# Patient Record
Sex: FEMALE | Race: WHITE | NOT HISPANIC OR LATINO | Employment: OTHER
[De-identification: names, ages, dates, MRNs, and addresses within clinical notes are randomized per-mention and may not be internally consistent; named-entity substitution may affect disease eponyms.]

---

## 2018-01-30 ENCOUNTER — HOSPITAL (OUTPATIENT)
Dept: OTHER | Age: 73
End: 2018-01-30
Attending: FAMILY MEDICINE

## 2018-10-03 ENCOUNTER — LAB ENCOUNTER (OUTPATIENT)
Dept: LAB | Facility: HOSPITAL | Age: 73
End: 2018-10-03
Attending: FAMILY MEDICINE
Payer: MEDICARE

## 2018-10-03 ENCOUNTER — OFFICE VISIT (OUTPATIENT)
Dept: FAMILY MEDICINE CLINIC | Facility: CLINIC | Age: 73
End: 2018-10-03
Payer: MEDICARE

## 2018-10-03 ENCOUNTER — HOSPITAL ENCOUNTER (OUTPATIENT)
Dept: GENERAL RADIOLOGY | Facility: HOSPITAL | Age: 73
Discharge: HOME | End: 2018-10-03
Attending: FAMILY MEDICINE
Payer: MEDICARE

## 2018-10-03 ENCOUNTER — TELEPHONE (OUTPATIENT)
Dept: FAMILY MEDICINE CLINIC | Facility: CLINIC | Age: 73
End: 2018-10-03

## 2018-10-03 VITALS
BODY MASS INDEX: 25.48 KG/M2 | HEIGHT: 70 IN | RESPIRATION RATE: 16 BRPM | OXYGEN SATURATION: 98 % | SYSTOLIC BLOOD PRESSURE: 140 MMHG | WEIGHT: 178 LBS | TEMPERATURE: 98 F | DIASTOLIC BLOOD PRESSURE: 80 MMHG | HEART RATE: 59 BPM

## 2018-10-03 DIAGNOSIS — Z01.812 PRE-OPERATIVE LABORATORY EXAMINATION: ICD-10-CM

## 2018-10-03 DIAGNOSIS — Z01.818 PREOP EXAMINATION: ICD-10-CM

## 2018-10-03 DIAGNOSIS — I10 ESSENTIAL HYPERTENSION: ICD-10-CM

## 2018-10-03 DIAGNOSIS — M17.12 ARTHRITIS OF LEFT KNEE: ICD-10-CM

## 2018-10-03 DIAGNOSIS — K21.00 GERD WITH ESOPHAGITIS: ICD-10-CM

## 2018-10-03 DIAGNOSIS — Z01.818 PREOP EXAMINATION: Primary | ICD-10-CM

## 2018-10-03 PROCEDURE — 86850 RBC ANTIBODY SCREEN: CPT

## 2018-10-03 PROCEDURE — 36415 COLL VENOUS BLD VENIPUNCTURE: CPT

## 2018-10-03 PROCEDURE — 93010 ELECTROCARDIOGRAM REPORT: CPT | Performed by: FAMILY MEDICINE

## 2018-10-03 PROCEDURE — 93005 ELECTROCARDIOGRAM TRACING: CPT

## 2018-10-03 PROCEDURE — 71046 X-RAY EXAM CHEST 2 VIEWS: CPT | Performed by: FAMILY MEDICINE

## 2018-10-03 PROCEDURE — 86901 BLOOD TYPING SEROLOGIC RH(D): CPT

## 2018-10-03 PROCEDURE — 81015 MICROSCOPIC EXAM OF URINE: CPT

## 2018-10-03 PROCEDURE — 87081 CULTURE SCREEN ONLY: CPT

## 2018-10-03 PROCEDURE — 85025 COMPLETE CBC W/AUTO DIFF WBC: CPT

## 2018-10-03 PROCEDURE — 99204 OFFICE O/P NEW MOD 45 MIN: CPT | Performed by: FAMILY MEDICINE

## 2018-10-03 PROCEDURE — 86900 BLOOD TYPING SEROLOGIC ABO: CPT

## 2018-10-03 PROCEDURE — 80053 COMPREHEN METABOLIC PANEL: CPT

## 2018-10-03 RX ORDER — HYDROCHLOROTHIAZIDE 25 MG/1
25 TABLET ORAL DAILY
COMMUNITY
Start: 2018-08-29

## 2018-10-03 NOTE — TELEPHONE ENCOUNTER
Surgery on 10/22/18, Lizz Pena with Dr. Greer Mandel @ [facility]  H&P- completed  Labs- slightly elevated bilirubin (1.3), elevated BUN/Creat (27.5),  all other labs WNL  EKG- abnormal  X-ray- WNL

## 2018-10-03 NOTE — PHYSICAL THERAPY NOTE
Pre-Op Evaluation     Date of surgery: 10/22/18             Procedure: Luis Monk  Physician: Desi Song    Pertinent PMH: GERD, HTN, Wears glasses, 2018 vascular sx B legs, 1998 heel spur removed, 2003 tummy tuck, former smoker, 2016 L meniscus tear  Living situat if she could stay there for assist post-operatively.  Based on pertinent PMH and social factors, pain level 7-8/10, and RAPT score of 6/12, patient's current discharge plan is home with family assist and home health but may choose to go directly to [facility]

## 2018-10-04 NOTE — H&P (VIEW-ONLY)
[facility] PRE-OP CLINIC [facility]    PRE-OP NOTE    HPI:   Prentis Osgood is a 68year old female with a hx of severe degenerative arthritis left knee who presents for a pre-operative physical exam. Patient is to have Left TKA by Dr. Ada Hoyos on 10/22/18.      P Frequency: Never      Drug use: No      Sexual activity: Not on file    Other Topics      Concerns:        Not on file    Social History Narrative      Not on file      REVIEW OF SYSTEMS:   CONSTITUTIONAL:  Denies unusual weight gain/loss, fever, chills  E good turgor. HEART:  Regular rate and rhythm, no murmurs, rubs or gallops. LUNGS: Clear to auscultation bilterally, no rales/rhonchi/wheezing. CHEST: No tenderness.   ABDOMEN:  Soft, nondistended, nontender, bowel sounds normal, no masses, no hepatosplen reviewed. No acute changes. Pt very active and asymptomatic. No further workup needed at this time. cxr with degererative changes, also no further work up needed.      After examining Logan Dire and reviewing the preoperative testing, she has an ASA Classificatio

## 2018-10-04 NOTE — TELEPHONE ENCOUNTER
Dr. Cem Munoz- please review labs- slightly elevated bilirubin (1.3), elevated BUN/Creat (27.5),  all other labs WNL  EKG- abnormal  X-ray- WNL    OK for surgery?

## 2018-10-04 NOTE — H&P
[facility] [place] CLIN[place]    PRE-OP NOTE    HPI:   Samra Harry is a 68year old female with a hx of severe degenerative arthritis left knee who presents for a pre-operative physical exam. Patient is to have Left TKA by Dr. Geri Schultz on 10/22/18.      P Frequency: Never      Drug use: No      Sexual activity: Not on file    Other Topics      Concerns:        Not on file    Social History Narrative      Not on file      REVIEW OF SYSTEMS:   CONSTITUTIONAL:  Denies unusual weight gain/loss, fever, chills  E good turgor. HEART:  Regular rate and rhythm, no murmurs, rubs or gallops. LUNGS: Clear to auscultation bilterally, no rales/rhonchi/wheezing. CHEST: No tenderness.   ABDOMEN:  Soft, nondistended, nontender, bowel sounds normal, no masses, no hepatosplen reviewed. No acute changes. Pt very active and asymptomatic. No further workup needed at this time. cxr with degererative changes, also no further work up needed.      After examining Judy Wang and reviewing the preoperative testing, she has an ASA Classificatio

## 2018-10-09 NOTE — TELEPHONE ENCOUNTER
Spoke to patient, let her know she is clear for surgery per Dr. Oanh Potts. She states understanding.

## 2018-10-16 RX ORDER — LANSOPRAZOLE 30 MG/1
30 CAPSULE, DELAYED RELEASE ORAL
Status: ON HOLD | COMMUNITY
End: 2018-10-22

## 2018-10-16 NOTE — DISCHARGE SUMMARY
Ortho Discharge Summary     Patient ID:  Logan Muñiz  [id number]  13 year old  9/11/1945      Admit Date: 10/22/2018    Discharge Date and Time: 10/23/2018    Attending Physician: Smita Keen MD     Reason for admission: left knee DJD    Discharge D

## 2018-10-22 ENCOUNTER — ANESTHESIA (OUTPATIENT)
Dept: SURGERY | Facility: HOSPITAL | Age: 73
DRG: 470 | End: 2018-10-22
Payer: MEDICARE

## 2018-10-22 ENCOUNTER — APPOINTMENT (OUTPATIENT)
Dept: GENERAL RADIOLOGY | Facility: HOSPITAL | Age: 73
DRG: 470 | End: 2018-10-22
Attending: PHYSICIAN ASSISTANT
Payer: MEDICARE

## 2018-10-22 ENCOUNTER — HOSPITAL ENCOUNTER (INPATIENT)
Facility: HOSPITAL | Age: 73
LOS: 1 days | Discharge: HOME HEALTH | DRG: 470 | End: 2018-10-23
Attending: ORTHOPAEDIC SURGERY | Admitting: ORTHOPAEDIC SURGERY
Payer: MEDICARE

## 2018-10-22 ENCOUNTER — ANESTHESIA EVENT (OUTPATIENT)
Dept: SURGERY | Facility: HOSPITAL | Age: 73
DRG: 470 | End: 2018-10-22
Payer: MEDICARE

## 2018-10-22 PROBLEM — Z96.659 S/P KNEE REPLACEMENT: Status: ACTIVE | Noted: 2018-10-22

## 2018-10-22 PROBLEM — Z96.652 STATUS POST LEFT KNEE REPLACEMENT: Status: ACTIVE | Noted: 2018-10-22

## 2018-10-22 PROCEDURE — 85014 HEMATOCRIT: CPT | Performed by: PHYSICIAN ASSISTANT

## 2018-10-22 PROCEDURE — 85018 HEMOGLOBIN: CPT | Performed by: PHYSICIAN ASSISTANT

## 2018-10-22 PROCEDURE — 88311 DECALCIFY TISSUE: CPT | Performed by: ORTHOPAEDIC SURGERY

## 2018-10-22 PROCEDURE — 0SRD0J9 REPLACEMENT OF LEFT KNEE JOINT WITH SYNTHETIC SUBSTITUTE, CEMENTED, OPEN APPROACH: ICD-10-PCS | Performed by: ORTHOPAEDIC SURGERY

## 2018-10-22 PROCEDURE — 73560 X-RAY EXAM OF KNEE 1 OR 2: CPT | Performed by: PHYSICIAN ASSISTANT

## 2018-10-22 PROCEDURE — 88305 TISSUE EXAM BY PATHOLOGIST: CPT | Performed by: ORTHOPAEDIC SURGERY

## 2018-10-22 PROCEDURE — 80053 COMPREHEN METABOLIC PANEL: CPT | Performed by: PHYSICIAN ASSISTANT

## 2018-10-22 RX ORDER — ASPIRIN 325 MG
325 TABLET, DELAYED RELEASE (ENTERIC COATED) ORAL 2 TIMES DAILY
Qty: 60 TABLET | Refills: 0 | Status: ON HOLD | OUTPATIENT
Start: 2018-10-22 | End: 2018-12-27

## 2018-10-22 RX ORDER — HYDROMORPHONE HYDROCHLORIDE 1 MG/ML
1.2 INJECTION, SOLUTION INTRAMUSCULAR; INTRAVENOUS; SUBCUTANEOUS EVERY 2 HOUR PRN
Status: DISCONTINUED | OUTPATIENT
Start: 2018-10-22 | End: 2018-10-23

## 2018-10-22 RX ORDER — SODIUM PHOSPHATE, DIBASIC AND SODIUM PHOSPHATE, MONOBASIC 7; 19 G/133ML; G/133ML
1 ENEMA RECTAL ONCE AS NEEDED
Status: DISCONTINUED | OUTPATIENT
Start: 2018-10-22 | End: 2018-10-23

## 2018-10-22 RX ORDER — METOCLOPRAMIDE 10 MG/1
10 TABLET ORAL ONCE
Status: COMPLETED | OUTPATIENT
Start: 2018-10-22 | End: 2018-10-22

## 2018-10-22 RX ORDER — ONDANSETRON 2 MG/ML
4 INJECTION INTRAMUSCULAR; INTRAVENOUS ONCE AS NEEDED
Status: DISCONTINUED | OUTPATIENT
Start: 2018-10-22 | End: 2018-10-22 | Stop reason: HOSPADM

## 2018-10-22 RX ORDER — CELECOXIB 200 MG/1
200 CAPSULE ORAL ONCE
Status: COMPLETED | OUTPATIENT
Start: 2018-10-22 | End: 2018-10-22

## 2018-10-22 RX ORDER — BISACODYL 10 MG
10 SUPPOSITORY, RECTAL RECTAL
Status: DISCONTINUED | OUTPATIENT
Start: 2018-10-22 | End: 2018-10-23

## 2018-10-22 RX ORDER — NALOXONE HYDROCHLORIDE 0.4 MG/ML
80 INJECTION, SOLUTION INTRAMUSCULAR; INTRAVENOUS; SUBCUTANEOUS AS NEEDED
Status: DISCONTINUED | OUTPATIENT
Start: 2018-10-22 | End: 2018-10-22 | Stop reason: HOSPADM

## 2018-10-22 RX ORDER — CEFAZOLIN SODIUM/WATER 2 G/20 ML
2 SYRINGE (ML) INTRAVENOUS ONCE
Status: DISCONTINUED | OUTPATIENT
Start: 2018-10-22 | End: 2018-10-22 | Stop reason: HOSPADM

## 2018-10-22 RX ORDER — FAMOTIDINE 20 MG/1
20 TABLET ORAL ONCE
Status: DISCONTINUED | OUTPATIENT
Start: 2018-10-22 | End: 2018-10-23

## 2018-10-22 RX ORDER — SODIUM CHLORIDE, SODIUM LACTATE, POTASSIUM CHLORIDE, CALCIUM CHLORIDE 600; 310; 30; 20 MG/100ML; MG/100ML; MG/100ML; MG/100ML
INJECTION, SOLUTION INTRAVENOUS CONTINUOUS
Status: DISCONTINUED | OUTPATIENT
Start: 2018-10-22 | End: 2018-10-22 | Stop reason: HOSPADM

## 2018-10-22 RX ORDER — HYDROMORPHONE HYDROCHLORIDE 1 MG/ML
INJECTION, SOLUTION INTRAMUSCULAR; INTRAVENOUS; SUBCUTANEOUS
Status: COMPLETED
Start: 2018-10-22 | End: 2018-10-22

## 2018-10-22 RX ORDER — DIPHENHYDRAMINE HYDROCHLORIDE 50 MG/ML
12.5 INJECTION INTRAMUSCULAR; INTRAVENOUS EVERY 4 HOURS PRN
Status: DISCONTINUED | OUTPATIENT
Start: 2018-10-22 | End: 2018-10-23

## 2018-10-22 RX ORDER — GABAPENTIN 100 MG/1
100 CAPSULE ORAL 3 TIMES DAILY
Status: DISCONTINUED | OUTPATIENT
Start: 2018-10-22 | End: 2018-10-23

## 2018-10-22 RX ORDER — ASPIRIN 325 MG
325 TABLET ORAL 2 TIMES DAILY
Status: DISCONTINUED | OUTPATIENT
Start: 2018-10-22 | End: 2018-10-23

## 2018-10-22 RX ORDER — ACETAMINOPHEN 500 MG
1000 TABLET ORAL ONCE
Status: COMPLETED | OUTPATIENT
Start: 2018-10-22 | End: 2018-10-22

## 2018-10-22 RX ORDER — SCOLOPAMINE TRANSDERMAL SYSTEM 1 MG/1
1 PATCH, EXTENDED RELEASE TRANSDERMAL ONCE
Status: DISCONTINUED | OUTPATIENT
Start: 2018-10-22 | End: 2018-10-23

## 2018-10-22 RX ORDER — CYCLOBENZAPRINE HCL 5 MG
5 TABLET ORAL EVERY 8 HOURS PRN
Status: DISCONTINUED | OUTPATIENT
Start: 2018-10-22 | End: 2018-10-23

## 2018-10-22 RX ORDER — IBUPROFEN 600 MG/1
600 TABLET ORAL 3 TIMES DAILY
Status: DISCONTINUED | OUTPATIENT
Start: 2018-10-22 | End: 2018-10-23

## 2018-10-22 RX ORDER — MIDAZOLAM HYDROCHLORIDE 1 MG/ML
INJECTION INTRAMUSCULAR; INTRAVENOUS AS NEEDED
Status: DISCONTINUED | OUTPATIENT
Start: 2018-10-22 | End: 2018-10-22 | Stop reason: SURG

## 2018-10-22 RX ORDER — DIPHENHYDRAMINE HYDROCHLORIDE 50 MG/ML
25 INJECTION INTRAMUSCULAR; INTRAVENOUS ONCE AS NEEDED
Status: ACTIVE | OUTPATIENT
Start: 2018-10-22 | End: 2018-10-22

## 2018-10-22 RX ORDER — HYDROCODONE BITARTRATE AND ACETAMINOPHEN 5; 325 MG/1; MG/1
1 TABLET ORAL AS NEEDED
Status: DISCONTINUED | OUTPATIENT
Start: 2018-10-22 | End: 2018-10-22 | Stop reason: HOSPADM

## 2018-10-22 RX ORDER — IBUPROFEN 600 MG/1
600 TABLET ORAL EVERY 8 HOURS PRN
Qty: 90 TABLET | Refills: 2 | Status: ON HOLD | OUTPATIENT
Start: 2018-10-22 | End: 2018-12-27

## 2018-10-22 RX ORDER — TRAMADOL HYDROCHLORIDE 50 MG/1
TABLET ORAL EVERY 6 HOURS PRN
Status: DISCONTINUED | OUTPATIENT
Start: 2018-10-22 | End: 2018-10-23

## 2018-10-22 RX ORDER — SODIUM CHLORIDE, SODIUM LACTATE, POTASSIUM CHLORIDE, CALCIUM CHLORIDE 600; 310; 30; 20 MG/100ML; MG/100ML; MG/100ML; MG/100ML
INJECTION, SOLUTION INTRAVENOUS CONTINUOUS
Status: DISCONTINUED | OUTPATIENT
Start: 2018-10-22 | End: 2018-10-23

## 2018-10-22 RX ORDER — METOCLOPRAMIDE HYDROCHLORIDE 5 MG/ML
10 INJECTION INTRAMUSCULAR; INTRAVENOUS EVERY 6 HOURS PRN
Status: DISCONTINUED | OUTPATIENT
Start: 2018-10-22 | End: 2018-10-23

## 2018-10-22 RX ORDER — DOCUSATE SODIUM 100 MG/1
100 CAPSULE, LIQUID FILLED ORAL 2 TIMES DAILY
Qty: 60 CAPSULE | Refills: 2 | Status: SHIPPED | OUTPATIENT
Start: 2018-10-22 | End: 2018-12-26 | Stop reason: ALTCHOICE

## 2018-10-22 RX ORDER — ONDANSETRON 2 MG/ML
INJECTION INTRAMUSCULAR; INTRAVENOUS AS NEEDED
Status: DISCONTINUED | OUTPATIENT
Start: 2018-10-22 | End: 2018-10-22 | Stop reason: SURG

## 2018-10-22 RX ORDER — ONDANSETRON 2 MG/ML
4 INJECTION INTRAMUSCULAR; INTRAVENOUS EVERY 4 HOURS PRN
Status: DISCONTINUED | OUTPATIENT
Start: 2018-10-22 | End: 2018-10-23

## 2018-10-22 RX ORDER — DOCUSATE SODIUM 100 MG/1
100 CAPSULE, LIQUID FILLED ORAL 2 TIMES DAILY
Status: DISCONTINUED | OUTPATIENT
Start: 2018-10-22 | End: 2018-10-23

## 2018-10-22 RX ORDER — HALOPERIDOL 5 MG/ML
0.25 INJECTION INTRAMUSCULAR ONCE AS NEEDED
Status: DISCONTINUED | OUTPATIENT
Start: 2018-10-22 | End: 2018-10-22 | Stop reason: HOSPADM

## 2018-10-22 RX ORDER — POLYETHYLENE GLYCOL 3350 17 G/17G
17 POWDER, FOR SOLUTION ORAL DAILY PRN
Status: DISCONTINUED | OUTPATIENT
Start: 2018-10-22 | End: 2018-10-23

## 2018-10-22 RX ORDER — FAMOTIDINE 20 MG/1
20 TABLET ORAL 2 TIMES DAILY
Status: DISCONTINUED | OUTPATIENT
Start: 2018-10-22 | End: 2018-10-23

## 2018-10-22 RX ORDER — DIPHENHYDRAMINE HCL 25 MG
25 CAPSULE ORAL EVERY 4 HOURS PRN
Status: DISCONTINUED | OUTPATIENT
Start: 2018-10-22 | End: 2018-10-23

## 2018-10-22 RX ORDER — CEFAZOLIN SODIUM/WATER 2 G/20 ML
2 SYRINGE (ML) INTRAVENOUS EVERY 8 HOURS
Status: COMPLETED | OUTPATIENT
Start: 2018-10-22 | End: 2018-10-23

## 2018-10-22 RX ORDER — ACETAMINOPHEN 325 MG/1
650 TABLET ORAL EVERY 6 HOURS SCHEDULED
Status: DISCONTINUED | OUTPATIENT
Start: 2018-10-22 | End: 2018-10-23

## 2018-10-22 RX ORDER — GABAPENTIN 100 MG/1
100 CAPSULE ORAL 3 TIMES DAILY
Qty: 90 CAPSULE | Refills: 0 | Status: ON HOLD | OUTPATIENT
Start: 2018-10-22 | End: 2018-12-27

## 2018-10-22 RX ORDER — HYDROMORPHONE HYDROCHLORIDE 1 MG/ML
0.8 INJECTION, SOLUTION INTRAMUSCULAR; INTRAVENOUS; SUBCUTANEOUS EVERY 2 HOUR PRN
Status: DISCONTINUED | OUTPATIENT
Start: 2018-10-22 | End: 2018-10-23

## 2018-10-22 RX ORDER — OXYCODONE HYDROCHLORIDE 5 MG/1
TABLET ORAL EVERY 4 HOURS PRN
Qty: 60 TABLET | Refills: 0 | Status: ON HOLD | OUTPATIENT
Start: 2018-10-22 | End: 2018-12-27

## 2018-10-22 RX ORDER — KETOROLAC TROMETHAMINE 15 MG/ML
15 INJECTION, SOLUTION INTRAMUSCULAR; INTRAVENOUS EVERY 8 HOURS
Status: DISCONTINUED | OUTPATIENT
Start: 2018-10-22 | End: 2018-10-23

## 2018-10-22 RX ORDER — OXYCODONE HYDROCHLORIDE 5 MG/1
5 TABLET ORAL EVERY 4 HOURS PRN
Status: DISCONTINUED | OUTPATIENT
Start: 2018-10-22 | End: 2018-10-23

## 2018-10-22 RX ORDER — FAMOTIDINE 10 MG/ML
20 INJECTION, SOLUTION INTRAVENOUS 2 TIMES DAILY
Status: DISCONTINUED | OUTPATIENT
Start: 2018-10-22 | End: 2018-10-23

## 2018-10-22 RX ORDER — HYDROMORPHONE HYDROCHLORIDE 1 MG/ML
0.4 INJECTION, SOLUTION INTRAMUSCULAR; INTRAVENOUS; SUBCUTANEOUS EVERY 2 HOUR PRN
Status: DISCONTINUED | OUTPATIENT
Start: 2018-10-22 | End: 2018-10-23

## 2018-10-22 RX ORDER — MORPHINE SULFATE 10 MG/ML
6 INJECTION, SOLUTION INTRAMUSCULAR; INTRAVENOUS EVERY 10 MIN PRN
Status: DISCONTINUED | OUTPATIENT
Start: 2018-10-22 | End: 2018-10-22 | Stop reason: HOSPADM

## 2018-10-22 RX ORDER — DEXAMETHASONE SODIUM PHOSPHATE 4 MG/ML
VIAL (ML) INJECTION AS NEEDED
Status: DISCONTINUED | OUTPATIENT
Start: 2018-10-22 | End: 2018-10-22 | Stop reason: SURG

## 2018-10-22 RX ORDER — ONDANSETRON 4 MG/1
4 TABLET, FILM COATED ORAL EVERY 8 HOURS PRN
Qty: 30 TABLET | Refills: 0 | Status: ON HOLD | OUTPATIENT
Start: 2018-10-22 | End: 2018-12-27

## 2018-10-22 RX ORDER — HYDROCODONE BITARTRATE AND ACETAMINOPHEN 5; 325 MG/1; MG/1
2 TABLET ORAL AS NEEDED
Status: DISCONTINUED | OUTPATIENT
Start: 2018-10-22 | End: 2018-10-22 | Stop reason: HOSPADM

## 2018-10-22 RX ORDER — OXYCODONE HYDROCHLORIDE 5 MG/1
10 TABLET ORAL EVERY 4 HOURS PRN
Status: DISCONTINUED | OUTPATIENT
Start: 2018-10-22 | End: 2018-10-23

## 2018-10-22 RX ORDER — SODIUM CHLORIDE 0.9 % (FLUSH) 0.9 %
10 SYRINGE (ML) INJECTION AS NEEDED
Status: DISCONTINUED | OUTPATIENT
Start: 2018-10-22 | End: 2018-10-23

## 2018-10-22 RX ORDER — BUPIVACAINE HYDROCHLORIDE 7.5 MG/ML
INJECTION, SOLUTION EPIDURAL; RETROBULBAR AS NEEDED
Status: DISCONTINUED | OUTPATIENT
Start: 2018-10-22 | End: 2018-10-22 | Stop reason: SURG

## 2018-10-22 RX ORDER — OXYCODONE HCL 10 MG/1
10 TABLET, FILM COATED, EXTENDED RELEASE ORAL ONCE
Status: COMPLETED | OUTPATIENT
Start: 2018-10-22 | End: 2018-10-22

## 2018-10-22 RX ORDER — PANTOPRAZOLE SODIUM 40 MG/1
40 TABLET, DELAYED RELEASE ORAL
Qty: 30 TABLET | Refills: 0 | Status: SHIPPED | OUTPATIENT
Start: 2018-10-22

## 2018-10-22 RX ORDER — MORPHINE SULFATE 4 MG/ML
4 INJECTION, SOLUTION INTRAMUSCULAR; INTRAVENOUS EVERY 10 MIN PRN
Status: DISCONTINUED | OUTPATIENT
Start: 2018-10-22 | End: 2018-10-22 | Stop reason: HOSPADM

## 2018-10-22 RX ORDER — MORPHINE SULFATE 4 MG/ML
2 INJECTION, SOLUTION INTRAMUSCULAR; INTRAVENOUS EVERY 10 MIN PRN
Status: DISCONTINUED | OUTPATIENT
Start: 2018-10-22 | End: 2018-10-22 | Stop reason: HOSPADM

## 2018-10-22 RX ORDER — TRAMADOL HYDROCHLORIDE 50 MG/1
TABLET ORAL EVERY 4 HOURS PRN
Qty: 60 TABLET | Refills: 0 | Status: ON HOLD | OUTPATIENT
Start: 2018-10-22 | End: 2018-12-27

## 2018-10-22 RX ADMIN — DEXAMETHASONE SODIUM PHOSPHATE 4 MG: 4 MG/ML VIAL (ML) INJECTION at 13:35:00

## 2018-10-22 RX ADMIN — SODIUM CHLORIDE, SODIUM LACTATE, POTASSIUM CHLORIDE, CALCIUM CHLORIDE: 600; 310; 30; 20 INJECTION, SOLUTION INTRAVENOUS at 15:35:00

## 2018-10-22 RX ADMIN — SODIUM CHLORIDE, SODIUM LACTATE, POTASSIUM CHLORIDE, CALCIUM CHLORIDE: 600; 310; 30; 20 INJECTION, SOLUTION INTRAVENOUS at 13:17:00

## 2018-10-22 RX ADMIN — MIDAZOLAM HYDROCHLORIDE 1 MG: 1 INJECTION INTRAMUSCULAR; INTRAVENOUS at 13:22:00

## 2018-10-22 RX ADMIN — BUPIVACAINE HYDROCHLORIDE 1.5 ML: 7.5 INJECTION, SOLUTION EPIDURAL; RETROBULBAR at 13:26:00

## 2018-10-22 RX ADMIN — ONDANSETRON 4 MG: 2 INJECTION INTRAMUSCULAR; INTRAVENOUS at 13:35:00

## 2018-10-22 NOTE — INTERVAL H&P NOTE
Pre-op Diagnosis: left knee degenerative joint disease    The above referenced H&P was reviewed by PASCUAL Ga on 10/22/2018, the patient was examined and no significant changes have occurred in the patient's condition since the H&P was performed.

## 2018-10-22 NOTE — FACE TO FACE NOTE
Face to Face Encounter    I (or a non-physician practitioner working in collaboration with me) had a face to face encounter with this patient during which a medical condition was addressed which is the primary reason for home health care on : 10/22/2018

## 2018-10-22 NOTE — ANESTHESIA PREPROCEDURE EVALUATION
Anesthesia PreOp Note    HPI:     Joe Kinsey is a 68year old female who presents for preoperative consultation requested by: Ronald Wisdom MD    Date of Surgery: 10/22/2018    Procedure(s):  KNEE TOTAL REPLACEMENT  Indication: left knee degenerative scopolamine (TRANSDERM-SCOP) patch 1 patch Transdermal Once Satinder Nichols MD 1 patch at 10/22/18 7078   clonidine/epinephrine/ropivacaine/ketorolac (CERTS) pain cocktail  Intra-articular Once Satinder Nichols MD    Tranexamic Acid (CYKLOKAPRON) 1,000 mg i insecurity - inability: Not on file      Transportation needs - medical: Not on file      Transportation needs - non-medical: Not on file    Occupational History      Not on file    Tobacco Use      Smoking status: Former Smoker        Packs/day: 0.25 Neuro/Psych      GI/Hepatic/Renal    (+) GERD,     Endo/Other    (+) arthritis  Abdominal              Anesthesia Plan:   ASA:  2  Plan:   Spinal  Post-op Pain Management: IV analgesics and Oral pain medication  Informed Consent Plan and Risks Discussed Wi

## 2018-10-22 NOTE — ANESTHESIA POSTPROCEDURE EVALUATION
Patient: Kristen Grullon    Procedure Summary     Date:  10/22/18 Room / Location:  [address] [place] [address] MAIN OR 09 / 300 [facility] MAIN OR    Anesthesia Start:  9992 Anesthesia Stop:      Procedure:  KNEE TOTAL REPLACEMENT (Left Knee) Diagnosis:  (left knee degenerative joint disea

## 2018-10-22 NOTE — ANESTHESIA PROCEDURE NOTES
Spinal Block  Performed by: Ronaldo Fuentes MD  Authorized by: Ronaldo Fuentes MD     Patient Location:  OR  Start Time:  10/22/2018 1:29 PM  End Time:  10/22/2018 1:36 PM  Reason for Block: at surgeon's request and surgical anesthesia    Anesthesiologist:

## 2018-10-23 VITALS
SYSTOLIC BLOOD PRESSURE: 107 MMHG | TEMPERATURE: 98 F | WEIGHT: 177 LBS | RESPIRATION RATE: 14 BRPM | BODY MASS INDEX: 25.34 KG/M2 | HEART RATE: 62 BPM | DIASTOLIC BLOOD PRESSURE: 50 MMHG | HEIGHT: 70 IN | OXYGEN SATURATION: 95 %

## 2018-10-23 PROCEDURE — A4216 STERILE WATER/SALINE, 10 ML: HCPCS | Performed by: PHYSICIAN ASSISTANT

## 2018-10-23 PROCEDURE — 80053 COMPREHEN METABOLIC PANEL: CPT | Performed by: FAMILY MEDICINE

## 2018-10-23 PROCEDURE — 97110 THERAPEUTIC EXERCISES: CPT

## 2018-10-23 PROCEDURE — 85025 COMPLETE CBC W/AUTO DIFF WBC: CPT | Performed by: FAMILY MEDICINE

## 2018-10-23 PROCEDURE — 97161 PT EVAL LOW COMPLEX 20 MIN: CPT

## 2018-10-23 PROCEDURE — S0028 INJECTION, FAMOTIDINE, 20 MG: HCPCS | Performed by: PHYSICIAN ASSISTANT

## 2018-10-23 PROCEDURE — 97116 GAIT TRAINING THERAPY: CPT

## 2018-10-23 NOTE — FACE TO FACE NOTE
[facility]  Face to Face Encounter Note    Saurabh Cameron Patient Status:  Outpatient in a Bed    1945 MRN [id number]   Location [facility] 4W/SW/SE Attending Maddison Roth MD   Hosp Day # 0 PCP No primary care provider on file. strength compromises outside safety.     Certification of home health services  Based on the above findings, I certify that this patient is confined to the home and needs intermittent skilled nursing care, physical therapy and/or speech therapy or continues

## 2018-10-23 NOTE — PHYSICAL THERAPY NOTE
PHYSICAL THERAPY QUICK EVALUATION - INPATIENT    Room Number: 434/434-A  Evaluation Date: 10/23/2018  Presenting Problem: L TKA  Physician Order: PT Eval and Treat    Problem List  Principal Problem:    Arthritis of left knee  Active Problems:    Status LLE  Management Techniques: Activity promotion; Body mechanics;Repositioning         AM-PAC '6-Clicks' INPATIENT SHORT FORM - BASIC MOBILITY  How much difficulty does the patient currently have. ..  -   Turning over in bed (including adjusting bedclothes, sh stairs x 2 w/ HR & SBA; VC's for sequencing. Pt completed bed mobility w/ supervision. At the end of the session, pt was left supine in bed w/ bed alarm on. Pt has no further questions or concerns. Pt will be d/c from IP PT services.  Recommend HHPT and 24

## 2018-10-23 NOTE — PROGRESS NOTES
[facility]    Ortho Medical Progress Note     Joby Alves Patient Status:  Outpatient in a Bed    1945 MRN [id number]   Location [facility] 4W/SW/SE Attending Vic Mg MD   Hosp Day # 0 PCP No primary care provider 10/22/2018  CONCLUSION:  1. Left knee arthroplasty.      Dictated by (CST): Paulie Bowens MD on 10/22/2018 at 16:59     Approved by (CST): Paulie Bowens MD on 10/22/2018 at 17:00                      Recommendations:  PT/OT: per ortho  Pain Control: per o

## 2018-10-23 NOTE — CM/SW NOTE
[facility] have been notified of pt's DC today, 10.23.18. Will follow up with face to face.       NIMO Lewis., [id number]

## 2018-10-23 NOTE — PROGRESS NOTES
[facility]    Progress Note    Nusrat Rosalbaemmy Patient Status:  Outpatient in a Bed    1945 MRN [id number]   Location [facility] 4W/SW/SE Attending Jalen Hollis MD   Hosp Day # 0 PCP No primary care provider on file. esophagitis        S/P knee replacement            Postoperative Recommendations:      Anticoagulation / DVT prophylaxis: SCDs,  mg twice daily with food for four weeks.     GI protection: Protonix  Incentive Spirometry       Pain management and Phys

## 2018-10-23 NOTE — CM/SW NOTE
SW met with the patient at bedside. The patient lives alone in multi-level home with 17 steps inside. The patient responds being independent prior to admission with all ADL's, ambulation and driving. Patient denies use of any DME.  The patient has family in

## 2018-10-23 NOTE — PROGRESS NOTES
[facility]    Progress Note    Pernell Webb Patient Status:  Outpatient in a Bed    1945 MRN [id number]   Location [facility] 4W/SW/SE Attending Dominique Hammer MD   Hosp Day # 1 PCP No primary care provider on file.

## 2018-10-23 NOTE — CM/SW NOTE
CTL note:  Spoke with Jennifer Van. APN r/t need for face to face for home care. Liza Long will complete - SW notified.

## 2018-10-27 NOTE — OPERATIVE REPORT
[address] [address]\Z OPERATIVE REPORT\b PATIENT NAME: Hema Cruz MR#: [id number] PMD: Cally Lazo DATE OF PROCEDURE: 10/22/2018\b PREOPERATIVE DIAGNOSIS: Degenerative Arthritis left knee\b surgery program on Monday, October 22, 2018. Following proper identification in the preoperative holding area with confirmation of the above-stated procedure, the patient was brought to the main operating room suite. Procedure was confirmed.  She received 2 between the medial and middle third of the tibial tubercle. This was provisionally pinned. A size 7 left EMPOWR 3D Non-Porous Femur femoral component was placed along with a 10 mm polyethylene spacer.  With this in place, the knee was able to reach full ext

## 2018-10-28 ENCOUNTER — HOSPITAL ENCOUNTER (EMERGENCY)
Facility: HOSPITAL | Age: 73
Discharge: HOME | End: 2018-10-29
Attending: EMERGENCY MEDICINE
Payer: MEDICARE

## 2018-10-28 DIAGNOSIS — M79.89 LEFT LEG SWELLING: ICD-10-CM

## 2018-10-28 DIAGNOSIS — Z48.89 ENCOUNTER FOR POSTOPERATIVE WOUND CHECK: Primary | ICD-10-CM

## 2018-10-28 PROCEDURE — 99284 EMERGENCY DEPT VISIT MOD MDM: CPT

## 2018-10-28 PROCEDURE — 36415 COLL VENOUS BLD VENIPUNCTURE: CPT

## 2018-10-29 ENCOUNTER — APPOINTMENT (OUTPATIENT)
Dept: ULTRASOUND IMAGING | Facility: HOSPITAL | Age: 73
End: 2018-10-29
Attending: EMERGENCY MEDICINE
Payer: MEDICARE

## 2018-10-29 VITALS
HEART RATE: 87 BPM | OXYGEN SATURATION: 95 % | DIASTOLIC BLOOD PRESSURE: 85 MMHG | SYSTOLIC BLOOD PRESSURE: 133 MMHG | RESPIRATION RATE: 18 BRPM | TEMPERATURE: 98 F

## 2018-10-29 PROCEDURE — 85025 COMPLETE CBC W/AUTO DIFF WBC: CPT | Performed by: EMERGENCY MEDICINE

## 2018-10-29 PROCEDURE — 80048 BASIC METABOLIC PNL TOTAL CA: CPT | Performed by: EMERGENCY MEDICINE

## 2018-10-29 PROCEDURE — 93971 EXTREMITY STUDY: CPT | Performed by: EMERGENCY MEDICINE

## 2018-10-29 PROCEDURE — 81003 URINALYSIS AUTO W/O SCOPE: CPT | Performed by: EMERGENCY MEDICINE

## 2018-10-29 NOTE — ED PROVIDER NOTES
Patient Seen in: [facility] AND [facility] Emergency Department    History   Patient presents with:  Fever (infectious)      HPI    The patient presents to the ED complaining of intermittent fevers for the past 4 days and increased swelling  to her left knee and file      Transportation needs - medical: Not on file      Transportation needs - non-medical: Not on file    Occupational History      Not on file    Tobacco Use      Smoking status: Former Smoker        Packs/day: 0.25        Years: 6.00        Pack year person, place, and time. Skin: Skin is warm and dry. No erythema. Psychiatric: She has a normal mood and affect. Her behavior is normal.   Nursing note and vitals reviewed.       ED Course        Labs Reviewed   BASIC METABOLIC PANEL (8) - Abnormal; Not in the ER at 12:50 Bee Rangel M.D.    ED Medications Administered: Medications - No data to display      MDM      10/28/18  2352 10/29/18  0230   BP: 141/81 133/85   Pulse: 86 87   Resp: 18 18   Temp: 98.9 °F (37.2 °C) 98.3 °F (36.8 °C)   Te [address]  [phone]    As needed      Medications Prescribed:  Discharge Medication List as of 10/29/2018  1:56 AM

## 2018-10-29 NOTE — ED NOTES
Resumed care of patient law s/p left knee replacement from 10/22. Home health nurse had her on tylenol/ibuprofen regimen but per her surgeon wanted her top come to the ED to r/o DVT. Pt reports being more swollen, red and painful.

## 2018-11-13 ENCOUNTER — HOSPITAL (OUTPATIENT)
Dept: OTHER | Age: 73
End: 2018-11-13

## 2018-12-01 ENCOUNTER — HOSPITAL (OUTPATIENT)
Dept: OTHER | Age: 73
End: 2018-12-01

## 2018-12-26 ENCOUNTER — ANESTHESIA EVENT (OUTPATIENT)
Dept: SURGERY | Facility: HOSPITAL | Age: 73
DRG: 554 | End: 2018-12-26
Payer: MEDICARE

## 2018-12-26 PROBLEM — M24.562: Status: ACTIVE | Noted: 2018-12-26

## 2018-12-26 RX ORDER — ACETAMINOPHEN 325 MG/1
650 TABLET ORAL EVERY 6 HOURS SCHEDULED
Status: CANCELLED | OUTPATIENT
Start: 2018-12-26

## 2018-12-26 RX ORDER — GABAPENTIN 100 MG/1
100 CAPSULE ORAL 3 TIMES DAILY
Status: CANCELLED | OUTPATIENT
Start: 2018-12-26

## 2018-12-26 RX ORDER — KETOROLAC TROMETHAMINE 30 MG/ML
30 INJECTION, SOLUTION INTRAMUSCULAR; INTRAVENOUS EVERY 8 HOURS
Status: CANCELLED | OUTPATIENT
Start: 2018-12-26 | End: 2018-12-27

## 2018-12-26 RX ORDER — TRAMADOL HYDROCHLORIDE 50 MG/1
TABLET ORAL EVERY 6 HOURS PRN
Status: CANCELLED | OUTPATIENT
Start: 2018-12-26

## 2018-12-26 RX ORDER — SODIUM PHOSPHATE, DIBASIC AND SODIUM PHOSPHATE, MONOBASIC 7; 19 G/133ML; G/133ML
1 ENEMA RECTAL ONCE AS NEEDED
Status: CANCELLED | OUTPATIENT
Start: 2018-12-26

## 2018-12-26 RX ORDER — POLYETHYLENE GLYCOL 3350 17 G/17G
17 POWDER, FOR SOLUTION ORAL DAILY PRN
Status: CANCELLED | OUTPATIENT
Start: 2018-12-26

## 2018-12-26 RX ORDER — DIPHENHYDRAMINE HCL 25 MG
25 CAPSULE ORAL EVERY 4 HOURS PRN
Status: CANCELLED | OUTPATIENT
Start: 2018-12-26

## 2018-12-26 RX ORDER — DIPHENHYDRAMINE HYDROCHLORIDE 50 MG/ML
12.5 INJECTION INTRAMUSCULAR; INTRAVENOUS EVERY 4 HOURS PRN
Status: CANCELLED | OUTPATIENT
Start: 2018-12-26

## 2018-12-26 RX ORDER — ASPIRIN 81 MG/1
81 TABLET, CHEWABLE ORAL DAILY
COMMUNITY

## 2018-12-26 RX ORDER — BISACODYL 10 MG
10 SUPPOSITORY, RECTAL RECTAL
Status: CANCELLED | OUTPATIENT
Start: 2018-12-26

## 2018-12-26 RX ORDER — HYDROMORPHONE HYDROCHLORIDE 1 MG/ML
0.5 INJECTION, SOLUTION INTRAMUSCULAR; INTRAVENOUS; SUBCUTANEOUS EVERY 2 HOUR PRN
Status: CANCELLED | OUTPATIENT
Start: 2018-12-26

## 2018-12-26 RX ORDER — HYDROMORPHONE HYDROCHLORIDE 1 MG/ML
1 INJECTION, SOLUTION INTRAMUSCULAR; INTRAVENOUS; SUBCUTANEOUS EVERY 2 HOUR PRN
Status: CANCELLED | OUTPATIENT
Start: 2018-12-26

## 2018-12-26 RX ORDER — METOCLOPRAMIDE HYDROCHLORIDE 5 MG/ML
10 INJECTION INTRAMUSCULAR; INTRAVENOUS EVERY 6 HOURS PRN
Status: CANCELLED | OUTPATIENT
Start: 2018-12-26 | End: 2018-12-28

## 2018-12-26 RX ORDER — DIPHENHYDRAMINE HYDROCHLORIDE 50 MG/ML
25 INJECTION INTRAMUSCULAR; INTRAVENOUS ONCE AS NEEDED
Status: CANCELLED | OUTPATIENT
Start: 2018-12-26 | End: 2018-12-26

## 2018-12-26 RX ORDER — SODIUM CHLORIDE, SODIUM LACTATE, POTASSIUM CHLORIDE, CALCIUM CHLORIDE 600; 310; 30; 20 MG/100ML; MG/100ML; MG/100ML; MG/100ML
INJECTION, SOLUTION INTRAVENOUS CONTINUOUS
Status: CANCELLED | OUTPATIENT
Start: 2018-12-26

## 2018-12-26 RX ORDER — OXYCODONE HYDROCHLORIDE 5 MG/1
10 TABLET ORAL EVERY 4 HOURS PRN
Status: CANCELLED | OUTPATIENT
Start: 2018-12-26

## 2018-12-26 RX ORDER — OXYCODONE HYDROCHLORIDE 5 MG/1
5 TABLET ORAL EVERY 4 HOURS PRN
Status: CANCELLED | OUTPATIENT
Start: 2018-12-26

## 2018-12-26 RX ORDER — CEFAZOLIN SODIUM/WATER 2 G/20 ML
2 SYRINGE (ML) INTRAVENOUS EVERY 8 HOURS
Status: CANCELLED | OUTPATIENT
Start: 2018-12-26 | End: 2018-12-27

## 2018-12-26 RX ORDER — MELATONIN
325
Status: CANCELLED | OUTPATIENT
Start: 2018-12-27

## 2018-12-26 RX ORDER — ONDANSETRON 2 MG/ML
4 INJECTION INTRAMUSCULAR; INTRAVENOUS EVERY 4 HOURS PRN
Status: CANCELLED | OUTPATIENT
Start: 2018-12-26 | End: 2018-12-28

## 2018-12-26 RX ORDER — DOCUSATE SODIUM 100 MG/1
100 CAPSULE, LIQUID FILLED ORAL 2 TIMES DAILY
Status: CANCELLED | OUTPATIENT
Start: 2018-12-26

## 2018-12-26 RX ORDER — IBUPROFEN 600 MG/1
600 TABLET ORAL 3 TIMES DAILY
Status: CANCELLED | OUTPATIENT
Start: 2018-12-27

## 2018-12-26 NOTE — PAT NURSING NOTE
Call in to Dr. Huntley Labor office to clarify SSS. Does he want CXR, CMP and MSSA/MRSA for knee manipulation? Also boarded as epidural anesthesia, pt stated was choice anesthesia.  Also looks like patient is spending the night, patient stated she thought she was

## 2018-12-26 NOTE — DISCHARGE SUMMARY
Ortho Discharge Summary     Patient ID:  Joby Alves  [id number]  57 year old  9/11/1945      Admit Date: 12/27/2018    Discharge Date and Time: 12/27/2018    Attending Physician: Vic Mg MD     Reason for admission: L knee arthofibrosis    Disch

## 2018-12-27 ENCOUNTER — HOSPITAL ENCOUNTER (OUTPATIENT)
Facility: HOSPITAL | Age: 73
Setting detail: HOSPITAL OUTPATIENT SURGERY
Discharge: HOME | DRG: 554 | End: 2018-12-27
Attending: ORTHOPAEDIC SURGERY | Admitting: ORTHOPAEDIC SURGERY
Payer: MEDICARE

## 2018-12-27 ENCOUNTER — ANESTHESIA (OUTPATIENT)
Dept: SURGERY | Facility: HOSPITAL | Age: 73
DRG: 554 | End: 2018-12-27
Payer: MEDICARE

## 2018-12-27 ENCOUNTER — APPOINTMENT (OUTPATIENT)
Dept: GENERAL RADIOLOGY | Facility: HOSPITAL | Age: 73
DRG: 554 | End: 2018-12-27
Attending: ORTHOPAEDIC SURGERY
Payer: MEDICARE

## 2018-12-27 VITALS
SYSTOLIC BLOOD PRESSURE: 132 MMHG | HEIGHT: 70 IN | DIASTOLIC BLOOD PRESSURE: 60 MMHG | RESPIRATION RATE: 18 BRPM | HEART RATE: 66 BPM | WEIGHT: 170 LBS | TEMPERATURE: 99 F | BODY MASS INDEX: 24.34 KG/M2 | OXYGEN SATURATION: 93 %

## 2018-12-27 PROCEDURE — 0SNDXZZ RELEASE LEFT KNEE JOINT, EXTERNAL APPROACH: ICD-10-PCS | Performed by: ORTHOPAEDIC SURGERY

## 2018-12-27 PROCEDURE — 73560 X-RAY EXAM OF KNEE 1 OR 2: CPT | Performed by: ORTHOPAEDIC SURGERY

## 2018-12-27 RX ORDER — ACETAMINOPHEN 500 MG
1000 TABLET ORAL ONCE
Status: DISCONTINUED | OUTPATIENT
Start: 2018-12-27 | End: 2018-12-27

## 2018-12-27 RX ORDER — CEFAZOLIN SODIUM/WATER 2 G/20 ML
2 SYRINGE (ML) INTRAVENOUS ONCE
Status: DISCONTINUED | OUTPATIENT
Start: 2018-12-27 | End: 2018-12-27 | Stop reason: HOSPADM

## 2018-12-27 RX ORDER — CEFAZOLIN SODIUM/WATER 2 G/20 ML
SYRINGE (ML) INTRAVENOUS
Status: DISCONTINUED
Start: 2018-12-27 | End: 2018-12-28

## 2018-12-27 RX ORDER — SODIUM CHLORIDE, SODIUM LACTATE, POTASSIUM CHLORIDE, CALCIUM CHLORIDE 600; 310; 30; 20 MG/100ML; MG/100ML; MG/100ML; MG/100ML
INJECTION, SOLUTION INTRAVENOUS CONTINUOUS
Status: DISCONTINUED | OUTPATIENT
Start: 2018-12-27 | End: 2018-12-28

## 2018-12-27 RX ORDER — GABAPENTIN 100 MG/1
100 CAPSULE ORAL 3 TIMES DAILY
Qty: 90 CAPSULE | Refills: 0 | Status: SHIPPED | OUTPATIENT
Start: 2018-12-27

## 2018-12-27 RX ORDER — OXYCODONE HYDROCHLORIDE 5 MG/1
TABLET ORAL EVERY 4 HOURS PRN
Qty: 60 TABLET | Refills: 0 | Status: SHIPPED | OUTPATIENT
Start: 2018-12-27

## 2018-12-27 RX ORDER — HYDROCODONE BITARTRATE AND ACETAMINOPHEN 5; 325 MG/1; MG/1
1 TABLET ORAL AS NEEDED
Status: COMPLETED | OUTPATIENT
Start: 2018-12-27 | End: 2018-12-27

## 2018-12-27 RX ORDER — NALOXONE HYDROCHLORIDE 0.4 MG/ML
80 INJECTION, SOLUTION INTRAMUSCULAR; INTRAVENOUS; SUBCUTANEOUS AS NEEDED
Status: DISCONTINUED | OUTPATIENT
Start: 2018-12-27 | End: 2018-12-28

## 2018-12-27 RX ORDER — DEXAMETHASONE SODIUM PHOSPHATE 4 MG/ML
4 VIAL (ML) INJECTION AS NEEDED
Status: DISCONTINUED | OUTPATIENT
Start: 2018-12-27 | End: 2018-12-28

## 2018-12-27 RX ORDER — ACETAMINOPHEN 500 MG
500 TABLET ORAL EVERY 6 HOURS PRN
Status: ON HOLD | COMMUNITY
End: 2018-12-27

## 2018-12-27 RX ORDER — HYDROCODONE BITARTRATE AND ACETAMINOPHEN 5; 325 MG/1; MG/1
1 TABLET ORAL EVERY 6 HOURS PRN
COMMUNITY

## 2018-12-27 RX ORDER — ONDANSETRON 4 MG/1
4 TABLET, FILM COATED ORAL EVERY 8 HOURS PRN
Qty: 30 TABLET | Refills: 0 | Status: SHIPPED | OUTPATIENT
Start: 2018-12-27

## 2018-12-27 RX ORDER — TRAMADOL HYDROCHLORIDE 50 MG/1
TABLET ORAL EVERY 4 HOURS PRN
Qty: 60 TABLET | Refills: 0 | Status: SHIPPED | OUTPATIENT
Start: 2018-12-27

## 2018-12-27 RX ORDER — METOCLOPRAMIDE HYDROCHLORIDE 5 MG/ML
10 INJECTION INTRAMUSCULAR; INTRAVENOUS AS NEEDED
Status: DISCONTINUED | OUTPATIENT
Start: 2018-12-27 | End: 2018-12-28

## 2018-12-27 RX ORDER — HYDROMORPHONE HYDROCHLORIDE 1 MG/ML
0.4 INJECTION, SOLUTION INTRAMUSCULAR; INTRAVENOUS; SUBCUTANEOUS EVERY 5 MIN PRN
Status: DISCONTINUED | OUTPATIENT
Start: 2018-12-27 | End: 2018-12-28

## 2018-12-27 RX ORDER — ONDANSETRON 2 MG/ML
4 INJECTION INTRAMUSCULAR; INTRAVENOUS AS NEEDED
Status: DISCONTINUED | OUTPATIENT
Start: 2018-12-27 | End: 2018-12-28

## 2018-12-27 RX ORDER — HYDROCODONE BITARTRATE AND ACETAMINOPHEN 5; 325 MG/1; MG/1
2 TABLET ORAL AS NEEDED
Status: COMPLETED | OUTPATIENT
Start: 2018-12-27 | End: 2018-12-27

## 2018-12-27 RX ORDER — IBUPROFEN 600 MG/1
600 TABLET ORAL EVERY 8 HOURS PRN
Qty: 90 TABLET | Refills: 2 | Status: SHIPPED | OUTPATIENT
Start: 2018-12-27

## 2018-12-27 RX ORDER — ACETAMINOPHEN 500 MG
TABLET ORAL EVERY 6 HOURS PRN
Qty: 90 TABLET | Refills: 0 | Status: SHIPPED | OUTPATIENT
Start: 2018-12-27

## 2018-12-27 NOTE — ANESTHESIA PREPROCEDURE EVALUATION
PRE-OP EVALUATION    Patient Name: Nabila Thayer    Pre-op Diagnosis: ARTHROFIBROSIS OF LEFT KNEE    Procedure(s):  LEFT KNEE MANIPULATION    Surgeon(s) and Role: Meagan Haley MD - Primary    Pre-op vitals reviewed. Body mass index is 24. 3 Bilateral 2018    vascular surgery on both legs   • OTHER SURGICAL HISTORY  2003    radha bernabe   • OTHER SURGICAL HISTORY      at 3years old had brain surgery, was in a coma for 1 years   • OTHER SURGICAL HISTORY Bilateral 1998    heel spur removal     Soc

## 2018-12-27 NOTE — H&P
PRE-OP NOTE     HPI:   Cosme Seals is a 68year old female with a hx of severe degenerative arthritis left knee who had left TKA on 10/22/18.    She has had decreased range of motion despite physical therapy and was felt to be best served with [facility] Concerns:        Not on file    Social History Narrative      Not on file         REVIEW OF SYSTEMS:   CONSTITUTIONAL:  Denies unusual weight gain/loss, fever, chills  EENT:  Eyes:  Denies eye pain, visual loss, blurred vision, double vision.  Ears, Nose, T MD

## 2018-12-27 NOTE — INTERVAL H&P NOTE
Pre-op Diagnosis: ARTHROFIBROSIS OF LEFT KNEE    The above referenced H&P was reviewed by PASCUAL Arndt on 12/27/2018, the patient was examined and no significant changes have occurred in the patient's condition since the H&P was performed.   I discuss

## 2018-12-28 NOTE — ANESTHESIA POSTPROCEDURE EVALUATION
[place] [facility] Patient Status:  Surgery Admit   Age/Gender 68year old female MRN [id number]   [facility] SURGERY Attending Kevin Hurst, [facility] Se Day # 0 PCP Patricia Rinaldi JEFF       Anesthesia Post-op Note    Procedure

## 2018-12-31 NOTE — OPERATIVE REPORT
[facility]    PATIENT'S NAME: Luc Love   ATTENDING PHYSICIAN: Ronald Davis. MD Heike   OPERATING PHYSICIAN: Ronald Davis.  MD Heike   PATIENT ACCOUNT#:   [id number]    LOCATION:  [address] [place] [address] 7 EDWP 10  MEDICAL RECORD #:   [id number]       MARIA a radiograph in full extension as well as intraoperatively   in maximum flexion was obtained. These showed no complication. She was transferred to her previous hospital bed, was brought to recovery room in stable condition. All counts were correct x2.

## 2019-01-01 ENCOUNTER — HOSPITAL (OUTPATIENT)
Dept: OTHER | Age: 74
End: 2019-01-01

## 2019-02-05 ENCOUNTER — HOSPITAL (OUTPATIENT)
Dept: OTHER | Age: 74
End: 2019-02-05

## 2019-03-01 ENCOUNTER — HOSPITAL (OUTPATIENT)
Dept: OTHER | Age: 74
End: 2019-03-01

## 2019-07-09 ENCOUNTER — HOSPITAL (OUTPATIENT)
Dept: OTHER | Age: 74
End: 2019-07-09

## 2019-08-01 ENCOUNTER — HOSPITAL (OUTPATIENT)
Dept: OTHER | Age: 74
End: 2019-08-01

## 2019-09-01 ENCOUNTER — HOSPITAL (OUTPATIENT)
Dept: OTHER | Age: 74
End: 2019-09-01

## 2022-03-04 ENCOUNTER — HOSPITAL ENCOUNTER (OUTPATIENT)
Dept: GENERAL RADIOLOGY | Age: 77
Discharge: HOME | End: 2022-03-04
Attending: CHIROPRACTOR

## 2022-03-04 DIAGNOSIS — M54.17 LUMBOSACRAL NEURITIS: ICD-10-CM

## 2022-03-04 DIAGNOSIS — M25.551 RIGHT HIP PAIN: ICD-10-CM

## 2022-03-04 PROCEDURE — 72110 X-RAY EXAM L-2 SPINE 4/>VWS: CPT

## 2022-03-04 PROCEDURE — 73502 X-RAY EXAM HIP UNI 2-3 VIEWS: CPT

## 2022-09-01 DIAGNOSIS — M15.9 PRIMARY OSTEOARTHRITIS INVOLVING MULTIPLE JOINTS: Primary | ICD-10-CM

## 2022-09-09 ENCOUNTER — HOSPITAL ENCOUNTER (OUTPATIENT)
Dept: REHABILITATION | Age: 77
Discharge: STILL ADMITTED OR ADMINISTRATIVE | End: 2022-09-09
Attending: INTERNAL MEDICINE

## 2022-09-09 PROCEDURE — 97110 THERAPEUTIC EXERCISES: CPT

## 2022-09-09 PROCEDURE — 97162 PT EVAL MOD COMPLEX 30 MIN: CPT

## 2022-09-09 ASSESSMENT — ENCOUNTER SYMPTOMS
PAIN SCALE AT HIGHEST: 8
QUALITY: ACHE
ALLEVIATING FACTORS: SUPPORTIVE DEVICE
QUALITY: STIFF
QUALITY: SORE
PAIN SCALE AT LOWEST: 6
PAIN FREQUENCY: CONSTANT
PAIN SEVERITY NOW: 6
SUBJECTIVE PAIN PROGRESSION: NO CHANGE
QUALITY: UNABLE TO SPECIFY
QUALITY: TIGHT

## 2022-09-14 ENCOUNTER — HOSPITAL ENCOUNTER (OUTPATIENT)
Dept: REHABILITATION | Age: 77
Discharge: STILL ADMITTED OR ADMINISTRATIVE | End: 2022-09-14
Attending: INTERNAL MEDICINE

## 2022-09-14 PROCEDURE — 97113 AQUATIC THERAPY/EXERCISES: CPT

## 2022-09-19 ENCOUNTER — HOSPITAL ENCOUNTER (OUTPATIENT)
Dept: REHABILITATION | Age: 77
Discharge: STILL ADMITTED OR ADMINISTRATIVE | End: 2022-09-19
Attending: INTERNAL MEDICINE

## 2022-09-19 PROCEDURE — 97113 AQUATIC THERAPY/EXERCISES: CPT

## 2022-09-23 ENCOUNTER — HOSPITAL ENCOUNTER (OUTPATIENT)
Dept: REHABILITATION | Age: 77
Discharge: STILL ADMITTED OR ADMINISTRATIVE | End: 2022-09-23
Attending: INTERNAL MEDICINE

## 2022-09-23 PROCEDURE — 97113 AQUATIC THERAPY/EXERCISES: CPT

## 2022-09-28 ENCOUNTER — HOSPITAL ENCOUNTER (OUTPATIENT)
Dept: REHABILITATION | Age: 77
Discharge: STILL ADMITTED OR ADMINISTRATIVE | End: 2022-09-28
Attending: INTERNAL MEDICINE

## 2022-09-28 PROCEDURE — 97113 AQUATIC THERAPY/EXERCISES: CPT

## 2022-10-03 ENCOUNTER — HOSPITAL ENCOUNTER (OUTPATIENT)
Dept: REHABILITATION | Age: 77
Discharge: STILL ADMITTED OR ADMINISTRATIVE | End: 2022-10-03
Attending: INTERNAL MEDICINE

## 2022-10-03 PROCEDURE — 97113 AQUATIC THERAPY/EXERCISES: CPT

## 2023-09-19 DIAGNOSIS — M15.9 PRIMARY OSTEOARTHRITIS INVOLVING MULTIPLE JOINTS: Primary | ICD-10-CM

## 2023-09-19 DIAGNOSIS — Z79.899 ENCOUNTER FOR LONG-TERM (CURRENT) DRUG USE: ICD-10-CM

## 2023-10-04 ENCOUNTER — APPOINTMENT (OUTPATIENT)
Dept: PHYSICAL THERAPY | Age: 78
End: 2023-10-04
Attending: INTERNAL MEDICINE

## 2023-10-18 ENCOUNTER — OFFICE VISIT (OUTPATIENT)
Dept: PHYSICAL THERAPY | Age: 78
End: 2023-10-18
Attending: INTERNAL MEDICINE

## 2023-10-18 DIAGNOSIS — M54.50 CHRONIC MIDLINE LOW BACK PAIN WITHOUT SCIATICA: ICD-10-CM

## 2023-10-18 DIAGNOSIS — M25.661 JOINT STIFFNESS OF KNEE, RIGHT: ICD-10-CM

## 2023-10-18 DIAGNOSIS — G89.29 CHRONIC MIDLINE LOW BACK PAIN WITHOUT SCIATICA: ICD-10-CM

## 2023-10-18 DIAGNOSIS — M25.612 STIFFNESS OF LEFT SHOULDER JOINT: ICD-10-CM

## 2023-10-18 DIAGNOSIS — M25.512 ACUTE PAIN OF LEFT SHOULDER: ICD-10-CM

## 2023-10-18 PROCEDURE — 97162 PT EVAL MOD COMPLEX 30 MIN: CPT | Performed by: PHYSICAL THERAPIST

## 2023-10-18 PROCEDURE — 97110 THERAPEUTIC EXERCISES: CPT | Performed by: PHYSICAL THERAPIST

## 2023-10-18 ASSESSMENT — ENCOUNTER SYMPTOMS
PAIN SCALE AT HIGHEST: 5
PAIN FREQUENCY: INTERMITTENT
PAIN LOCATION: LEFT SHOULDER
ALLEVIATING FACTORS: AVOIDING MOVEMENT IN INVOLVED AREA
QUALITY: ACHE
QUALITY: STIFF
QUALITY: SORE
PAIN SCALE AT LOWEST: 0
PAIN SEVERITY NOW: 0
SUBJECTIVE PAIN PROGRESSION: WORSENING

## 2023-10-18 ASSESSMENT — MOVEMENT AND STRENGTH ASSESSMENTS
LIFTING AN OBJECT, LIKE A BAG OF GROCERIES, FROM THE FLOOR: A LITTLE BIT OF DIFFICULTY
WALKING BETWEEN ROOMS: NO DIFFICULTY
STANDING FOR 1 HOUR: MODERATE DIFFICULTY
SITTING FOR 1 HOUR: A LITTLE BIT OF DIFFICULTY
TOTAL SCORE: 47.5
PUTTING ON YOUR SHOES OR SOCKS: A LITTLE BIT OF DIFFICULTY
GETTING INTO OR OUT OF THE BATH: A LITTLE BIT OF DIFFICULTY
MAKING SHARP TURNS WHILE RUNNING FAST: EXTREME DIFFICULTY OR UNABLE TO PERFORM ACTIVITY
ANY OF YOUR USUAL WORK, HOUSEWORK OR SCHOOL ACTIVITIES: QUITE A BIT OF DIFFICULTY
HOPPING: EXTREME DIFFICULTY OR UNABLE TO PERFORM ACTIVITY
PERFORMING LIGHT ACTIVITES AROUND YOUR HOME: MODERATE DIFFICULTY
GOING UP OR DOWN 10 STAIRS (ABOUT 1 FLIGHT OF STAIRS): MODERATE DIFFICULTY
WALKING A MILE: MODERATE DIFFICULTY
PERFORMING HEAVY ACTIVITIES AROUND YOUR HOME: QUITE A BIT OF DIFFICULTY
RUNNING ON EVEN GROUND: EXTREME DIFFICULTY OR UNABLE TO PERFORM ACTIVITY
YOUR USUAL HOBBIES, RECREATIONAL OR SPORTING ACTIVIITIES: MODERATE DIFFICULTY
ROLLING OVER IN BED: A LITTLE BIT OF DIFFICULTY
RUNNING ON UNEVEN GROUND: EXTREME DIFFICULTY OR UNABLE TO PERFORM ACTIVITY
WALKING 2 BLOCKS: A LITTLE BIT OF DIFFICULTY
GETTING INTO OR OUT OF A CAR: A LITTLE BIT OF DIFFICULTY
SQUATTING: QUITE A BIT OF DIFFICULTY

## 2023-10-31 ENCOUNTER — APPOINTMENT (OUTPATIENT)
Dept: REHABILITATION | Age: 78
End: 2023-10-31

## 2023-11-02 ENCOUNTER — APPOINTMENT (OUTPATIENT)
Dept: GENERAL RADIOLOGY | Age: 78
End: 2023-11-02
Attending: EMERGENCY MEDICINE

## 2023-11-02 ENCOUNTER — HOSPITAL ENCOUNTER (OUTPATIENT)
Dept: REHABILITATION | Age: 78
Discharge: STILL ADMITTED OR ADMINISTRATIVE | End: 2023-11-02

## 2023-11-02 ENCOUNTER — HOSPITAL ENCOUNTER (EMERGENCY)
Age: 78
Discharge: HOME | End: 2023-11-02
Attending: EMERGENCY MEDICINE

## 2023-11-02 VITALS
DIASTOLIC BLOOD PRESSURE: 58 MMHG | TEMPERATURE: 98.1 F | SYSTOLIC BLOOD PRESSURE: 106 MMHG | HEART RATE: 74 BPM | RESPIRATION RATE: 18 BRPM | WEIGHT: 167.11 LBS | OXYGEN SATURATION: 93 %

## 2023-11-02 DIAGNOSIS — I48.0 PAROXYSMAL ATRIAL FIBRILLATION (CMD): Primary | ICD-10-CM

## 2023-11-02 LAB
ALBUMIN SERPL-MCNC: 4.2 G/DL (ref 3.6–5.1)
ALBUMIN/GLOB SERPL: 1.3 {RATIO} (ref 1–2.4)
ALP SERPL-CCNC: 101 UNITS/L (ref 45–117)
ALT SERPL-CCNC: 26 UNITS/L
ANION GAP SERPL CALC-SCNC: 13 MMOL/L (ref 7–19)
APTT PPP: 27 SEC (ref 22–32)
AST SERPL-CCNC: 19 UNITS/L
BASOPHILS # BLD: 0 K/MCL (ref 0–0.3)
BASOPHILS NFR BLD: 1 %
BILIRUB SERPL-MCNC: 1 MG/DL (ref 0.2–1)
BUN SERPL-MCNC: 24 MG/DL (ref 6–20)
BUN/CREAT SERPL: 34 (ref 7–25)
CALCIUM SERPL-MCNC: 10.2 MG/DL (ref 8.4–10.2)
CHLORIDE SERPL-SCNC: 106 MMOL/L (ref 97–110)
CO2 SERPL-SCNC: 31 MMOL/L (ref 21–32)
CREAT SERPL-MCNC: 0.7 MG/DL (ref 0.51–0.95)
DEPRECATED RDW RBC: 41.5 FL (ref 39–50)
EGFRCR SERPLBLD CKD-EPI 2021: 88 ML/MIN/{1.73_M2}
EOSINOPHIL # BLD: 0.1 K/MCL (ref 0–0.5)
EOSINOPHIL NFR BLD: 2 %
ERYTHROCYTE [DISTWIDTH] IN BLOOD: 12.3 % (ref 11–15)
FASTING DURATION TIME PATIENT: ABNORMAL H
GLOBULIN SER-MCNC: 3.3 G/DL (ref 2–4)
GLUCOSE SERPL-MCNC: 103 MG/DL (ref 70–99)
HCT VFR BLD CALC: 47.5 % (ref 36–46.5)
HGB BLD-MCNC: 15 G/DL (ref 12–15.5)
IMM GRANULOCYTES # BLD AUTO: 0 K/MCL (ref 0–0.2)
IMM GRANULOCYTES # BLD: 1 %
INR PPP: 1
LYMPHOCYTES # BLD: 2.1 K/MCL (ref 1–4)
LYMPHOCYTES NFR BLD: 42 %
MAGNESIUM SERPL-MCNC: 2.1 MG/DL (ref 1.7–2.4)
MCH RBC QN AUTO: 29 PG (ref 26–34)
MCHC RBC AUTO-ENTMCNC: 31.6 G/DL (ref 32–36.5)
MCV RBC AUTO: 91.9 FL (ref 78–100)
MONOCYTES # BLD: 0.4 K/MCL (ref 0.3–0.9)
MONOCYTES NFR BLD: 8 %
NEUTROPHILS # BLD: 2.3 K/MCL (ref 1.8–7.7)
NEUTROPHILS NFR BLD: 46 %
NRBC BLD MANUAL-RTO: 0 /100 WBC
PLATELET # BLD AUTO: 206 K/MCL (ref 140–450)
POTASSIUM SERPL-SCNC: 3.7 MMOL/L (ref 3.4–5.1)
PROT SERPL-MCNC: 7.5 G/DL (ref 6.4–8.2)
PROTHROMBIN TIME: 11.2 SEC (ref 9.7–11.8)
RBC # BLD: 5.17 MIL/MCL (ref 4–5.2)
SODIUM SERPL-SCNC: 146 MMOL/L (ref 135–145)
TROPONIN I SERPL DL<=0.01 NG/ML-MCNC: 12 NG/L
WBC # BLD: 5 K/MCL (ref 4.2–11)

## 2023-11-02 PROCEDURE — 99285 EMERGENCY DEPT VISIT HI MDM: CPT

## 2023-11-02 PROCEDURE — 97140 MANUAL THERAPY 1/> REGIONS: CPT | Performed by: PHYSICAL THERAPIST

## 2023-11-02 PROCEDURE — 93005 ELECTROCARDIOGRAM TRACING: CPT | Performed by: EMERGENCY MEDICINE

## 2023-11-02 PROCEDURE — 85610 PROTHROMBIN TIME: CPT | Performed by: EMERGENCY MEDICINE

## 2023-11-02 PROCEDURE — 84484 ASSAY OF TROPONIN QUANT: CPT | Performed by: EMERGENCY MEDICINE

## 2023-11-02 PROCEDURE — 97110 THERAPEUTIC EXERCISES: CPT | Performed by: PHYSICAL THERAPIST

## 2023-11-02 PROCEDURE — 85025 COMPLETE CBC W/AUTO DIFF WBC: CPT | Performed by: EMERGENCY MEDICINE

## 2023-11-02 PROCEDURE — 36415 COLL VENOUS BLD VENIPUNCTURE: CPT

## 2023-11-02 PROCEDURE — 80053 COMPREHEN METABOLIC PANEL: CPT | Performed by: EMERGENCY MEDICINE

## 2023-11-02 PROCEDURE — 83735 ASSAY OF MAGNESIUM: CPT | Performed by: EMERGENCY MEDICINE

## 2023-11-02 PROCEDURE — 10002801 HB RX 250 W/O HCPCS: Performed by: EMERGENCY MEDICINE

## 2023-11-02 PROCEDURE — 71046 X-RAY EXAM CHEST 2 VIEWS: CPT

## 2023-11-02 PROCEDURE — 85730 THROMBOPLASTIN TIME PARTIAL: CPT | Performed by: EMERGENCY MEDICINE

## 2023-11-02 ASSESSMENT — ENCOUNTER SYMPTOMS
PAIN SCALE AT HIGHEST: 9
PAIN SEVERITY NOW: 3

## 2023-11-06 LAB
P AXIS (DEGREES): 38
PR-INTERVAL (MSEC): 181
QRS-INTERVAL (MSEC): 108
QRS-INTERVAL (MSEC): 119
QT-INTERVAL (MSEC): 295
QT-INTERVAL (MSEC): 354
QTC: 371
QTC: 395
R AXIS (DEGREES): -4
R AXIS (DEGREES): -4
REPORT TEXT: NORMAL
REPORT TEXT: NORMAL
T AXIS (DEGREES): 32
T AXIS (DEGREES): 40
VENTRICULAR RATE EKG/MIN (BPM): 129
VENTRICULAR RATE EKG/MIN (BPM): 84

## 2023-11-07 ENCOUNTER — HOSPITAL ENCOUNTER (OUTPATIENT)
Dept: REHABILITATION | Age: 78
Discharge: STILL ADMITTED OR ADMINISTRATIVE | End: 2023-11-07

## 2023-11-07 PROCEDURE — 97140 MANUAL THERAPY 1/> REGIONS: CPT | Performed by: PHYSICAL THERAPIST

## 2023-11-07 PROCEDURE — 97110 THERAPEUTIC EXERCISES: CPT | Performed by: PHYSICAL THERAPIST

## 2023-11-07 ASSESSMENT — ENCOUNTER SYMPTOMS
PAIN SCALE AT HIGHEST: 10
PAIN SEVERITY NOW: 3

## 2023-11-09 ENCOUNTER — HOSPITAL ENCOUNTER (OUTPATIENT)
Dept: REHABILITATION | Age: 78
Discharge: STILL ADMITTED OR ADMINISTRATIVE | End: 2023-11-09

## 2023-11-09 PROCEDURE — 97110 THERAPEUTIC EXERCISES: CPT | Performed by: PHYSICAL THERAPIST

## 2023-11-09 PROCEDURE — 97140 MANUAL THERAPY 1/> REGIONS: CPT | Performed by: PHYSICAL THERAPIST

## 2023-11-09 ASSESSMENT — ENCOUNTER SYMPTOMS
PAIN SCALE AT HIGHEST: 10
PAIN SEVERITY NOW: 3

## 2023-11-14 ENCOUNTER — HOSPITAL ENCOUNTER (OUTPATIENT)
Dept: REHABILITATION | Age: 78
Discharge: STILL ADMITTED OR ADMINISTRATIVE | End: 2023-11-14

## 2023-11-14 PROCEDURE — 97140 MANUAL THERAPY 1/> REGIONS: CPT | Performed by: PHYSICAL THERAPIST

## 2023-11-14 PROCEDURE — 97110 THERAPEUTIC EXERCISES: CPT | Performed by: PHYSICAL THERAPIST

## 2023-11-14 ASSESSMENT — ENCOUNTER SYMPTOMS
PAIN SCALE AT HIGHEST: 10
PAIN SEVERITY NOW: 4

## 2023-11-16 ENCOUNTER — HOSPITAL ENCOUNTER (OUTPATIENT)
Dept: REHABILITATION | Age: 78
Discharge: STILL ADMITTED OR ADMINISTRATIVE | End: 2023-11-16

## 2023-11-16 PROCEDURE — 97110 THERAPEUTIC EXERCISES: CPT | Performed by: PHYSICAL THERAPIST

## 2023-11-16 PROCEDURE — 97140 MANUAL THERAPY 1/> REGIONS: CPT | Performed by: PHYSICAL THERAPIST

## 2023-11-16 ASSESSMENT — ENCOUNTER SYMPTOMS: PAIN SEVERITY NOW: 3

## 2023-11-21 ENCOUNTER — APPOINTMENT (OUTPATIENT)
Dept: REHABILITATION | Age: 78
End: 2023-11-21

## 2023-11-28 ENCOUNTER — APPOINTMENT (OUTPATIENT)
Dept: REHABILITATION | Age: 78
End: 2023-11-28

## 2023-11-28 PROCEDURE — 97140 MANUAL THERAPY 1/> REGIONS: CPT | Performed by: PHYSICAL THERAPIST

## 2023-11-28 PROCEDURE — 97110 THERAPEUTIC EXERCISES: CPT | Performed by: PHYSICAL THERAPIST

## 2023-11-28 ASSESSMENT — ENCOUNTER SYMPTOMS: PAIN SEVERITY NOW: 4

## 2023-12-05 ENCOUNTER — HOSPITAL ENCOUNTER (OUTPATIENT)
Dept: REHABILITATION | Age: 78
Discharge: STILL ADMITTED OR ADMINISTRATIVE | End: 2023-12-05

## 2023-12-05 PROCEDURE — 97140 MANUAL THERAPY 1/> REGIONS: CPT | Performed by: PHYSICAL THERAPIST

## 2023-12-05 PROCEDURE — 97530 THERAPEUTIC ACTIVITIES: CPT | Performed by: PHYSICAL THERAPIST

## 2023-12-05 ASSESSMENT — ENCOUNTER SYMPTOMS: PAIN SEVERITY NOW: 5

## 2025-01-04 ENCOUNTER — HOSPITAL ENCOUNTER (INPATIENT)
Age: 80
DRG: 603 | End: 2025-01-04
Attending: EMERGENCY MEDICINE | Admitting: STUDENT IN AN ORGANIZED HEALTH CARE EDUCATION/TRAINING PROGRAM

## 2025-01-04 ENCOUNTER — APPOINTMENT (OUTPATIENT)
Dept: ULTRASOUND IMAGING | Age: 80
DRG: 603 | End: 2025-01-04

## 2025-01-04 DIAGNOSIS — L03.90 CELLULITIS, UNSPECIFIED CELLULITIS SITE: Primary | ICD-10-CM

## 2025-01-04 LAB
ALBUMIN SERPL-MCNC: 3.4 G/DL (ref 3.4–5)
ALBUMIN/GLOB SERPL: 1 {RATIO} (ref 1–2.4)
ALP SERPL-CCNC: 100 UNITS/L (ref 45–117)
ALT SERPL-CCNC: 19 UNITS/L
ANION GAP SERPL CALC-SCNC: 8 MMOL/L (ref 7–19)
APTT PPP: 28 SEC (ref 22–32)
AST SERPL-CCNC: 19 UNITS/L
BASOPHILS # BLD: 0 K/MCL (ref 0–0.3)
BASOPHILS NFR BLD: 0 %
BILIRUB SERPL-MCNC: 0.5 MG/DL (ref 0.2–1)
BUN SERPL-MCNC: 19 MG/DL (ref 6–20)
BUN/CREAT SERPL: 33 (ref 7–25)
CALCIUM SERPL-MCNC: 9.3 MG/DL (ref 8.4–10.2)
CHLORIDE SERPL-SCNC: 106 MMOL/L (ref 97–110)
CO2 SERPL-SCNC: 32 MMOL/L (ref 21–32)
CREAT SERPL-MCNC: 0.57 MG/DL (ref 0.51–0.95)
DEPRECATED RDW RBC: 42 FL (ref 39–50)
EGFRCR SERPLBLD CKD-EPI 2021: >90 ML/MIN/{1.73_M2}
EOSINOPHIL # BLD: 0.3 K/MCL (ref 0–0.5)
EOSINOPHIL NFR BLD: 5 %
ERYTHROCYTE [DISTWIDTH] IN BLOOD: 12.8 % (ref 11–15)
FASTING DURATION TIME PATIENT: ABNORMAL H
GLOBULIN SER-MCNC: 3.5 G/DL (ref 2–4)
GLUCOSE SERPL-MCNC: 107 MG/DL (ref 70–99)
HCT VFR BLD CALC: 31.9 % (ref 36–46.5)
HGB BLD-MCNC: 9.9 G/DL (ref 12–15.5)
IMM GRANULOCYTES # BLD AUTO: 0 K/MCL (ref 0–0.2)
IMM GRANULOCYTES # BLD: 0 %
INR PPP: 1.1
LYMPHOCYTES # BLD: 1 K/MCL (ref 1–4)
LYMPHOCYTES NFR BLD: 19 %
MCH RBC QN AUTO: 27.5 PG (ref 26–34)
MCHC RBC AUTO-ENTMCNC: 31 G/DL (ref 32–36.5)
MCV RBC AUTO: 88.6 FL (ref 78–100)
MONOCYTES # BLD: 0.4 K/MCL (ref 0.3–0.9)
MONOCYTES NFR BLD: 8 %
NEUTROPHILS # BLD: 3.6 K/MCL (ref 1.8–7.7)
NEUTROPHILS NFR BLD: 68 %
NRBC BLD MANUAL-RTO: 0 /100 WBC
PLATELET # BLD AUTO: 290 K/MCL (ref 140–450)
POTASSIUM SERPL-SCNC: 3.5 MMOL/L (ref 3.4–5.1)
PROT SERPL-MCNC: 6.9 G/DL (ref 6.4–8.2)
PROTHROMBIN TIME: 11.6 SEC (ref 9.7–11.8)
RBC # BLD: 3.6 MIL/MCL (ref 4–5.2)
SODIUM SERPL-SCNC: 142 MMOL/L (ref 135–145)
WBC # BLD: 5.3 K/MCL (ref 4.2–11)

## 2025-01-04 PROCEDURE — 99284 EMERGENCY DEPT VISIT MOD MDM: CPT

## 2025-01-04 PROCEDURE — 85730 THROMBOPLASTIN TIME PARTIAL: CPT

## 2025-01-04 PROCEDURE — 85025 COMPLETE CBC W/AUTO DIFF WBC: CPT

## 2025-01-04 PROCEDURE — 96375 TX/PRO/DX INJ NEW DRUG ADDON: CPT

## 2025-01-04 PROCEDURE — 93971 EXTREMITY STUDY: CPT

## 2025-01-04 PROCEDURE — G0378 HOSPITAL OBSERVATION PER HR: HCPCS

## 2025-01-04 PROCEDURE — 10002803 HB RX 637: Performed by: EMERGENCY MEDICINE

## 2025-01-04 PROCEDURE — 85610 PROTHROMBIN TIME: CPT

## 2025-01-04 PROCEDURE — 80053 COMPREHEN METABOLIC PANEL: CPT

## 2025-01-04 PROCEDURE — 10002801 HB RX 250 W/O HCPCS: Performed by: EMERGENCY MEDICINE

## 2025-01-04 PROCEDURE — 10002800 HB RX 250 W HCPCS: Performed by: EMERGENCY MEDICINE

## 2025-01-04 PROCEDURE — 10002807 HB RX 258: Performed by: EMERGENCY MEDICINE

## 2025-01-04 PROCEDURE — 96365 THER/PROPH/DIAG IV INF INIT: CPT

## 2025-01-04 RX ORDER — DIPHENHYDRAMINE HYDROCHLORIDE 50 MG/ML
25 INJECTION INTRAMUSCULAR; INTRAVENOUS ONCE
Status: COMPLETED | OUTPATIENT
Start: 2025-01-04 | End: 2025-01-04

## 2025-01-04 RX ORDER — ACETAMINOPHEN AND CODEINE PHOSPHATE 300; 30 MG/1; MG/1
1 TABLET ORAL ONCE
Status: COMPLETED | OUTPATIENT
Start: 2025-01-04 | End: 2025-01-04

## 2025-01-04 RX ORDER — FAMOTIDINE 10 MG/ML
20 INJECTION, SOLUTION INTRAVENOUS ONCE
Status: COMPLETED | OUTPATIENT
Start: 2025-01-04 | End: 2025-01-04

## 2025-01-04 RX ADMIN — DIPHENHYDRAMINE HYDROCHLORIDE 25 MG: 50 INJECTION, SOLUTION INTRAMUSCULAR; INTRAVENOUS at 23:18

## 2025-01-04 RX ADMIN — VANCOMYCIN HYDROCHLORIDE 1000 MG: 1 INJECTION, POWDER, LYOPHILIZED, FOR SOLUTION INTRAVENOUS at 22:41

## 2025-01-04 RX ADMIN — WATER 2000 MG: 1 INJECTION INTRAMUSCULAR; INTRAVENOUS; SUBCUTANEOUS at 22:30

## 2025-01-04 RX ADMIN — FAMOTIDINE 20 MG: 10 INJECTION INTRAVENOUS at 23:15

## 2025-01-04 RX ADMIN — ACETAMINOPHEN AND CODEINE PHOSPHATE 1 TABLET: 300; 30 TABLET ORAL at 22:28

## 2025-01-04 ASSESSMENT — PAIN SCALES - GENERAL
PAINLEVEL_OUTOF10: 6
PAINLEVEL_OUTOF10: 5
PAINLEVEL_OUTOF10: 5

## 2025-01-05 VITALS
RESPIRATION RATE: 17 BRPM | SYSTOLIC BLOOD PRESSURE: 126 MMHG | HEIGHT: 70 IN | OXYGEN SATURATION: 96 % | TEMPERATURE: 98.8 F | HEART RATE: 77 BPM | BODY MASS INDEX: 26.37 KG/M2 | DIASTOLIC BLOOD PRESSURE: 68 MMHG | WEIGHT: 184.19 LBS

## 2025-01-05 PROBLEM — I10 HTN (HYPERTENSION): Status: ACTIVE | Noted: 2025-01-05

## 2025-01-05 PROBLEM — E78.5 HLD (HYPERLIPIDEMIA): Status: ACTIVE | Noted: 2025-01-05

## 2025-01-05 PROBLEM — L03.90 CELLULITIS, UNSPECIFIED CELLULITIS SITE: Status: RESOLVED | Noted: 2025-01-04 | Resolved: 2025-01-05

## 2025-01-05 PROBLEM — D64.9 NORMOCYTIC ANEMIA: Status: ACTIVE | Noted: 2025-01-05

## 2025-01-05 PROBLEM — I87.2 VENOUS INSUFFICIENCY: Status: ACTIVE | Noted: 2025-01-05

## 2025-01-05 PROBLEM — L03.119 CELLULITIS OF LEG WITHOUT FOOT: Status: ACTIVE | Noted: 2025-01-05

## 2025-01-05 PROBLEM — I48.91 ATRIAL FIBRILLATION  (CMD): Status: ACTIVE | Noted: 2025-01-05

## 2025-01-05 PROBLEM — Z96.641 HISTORY OF RIGHT HIP REPLACEMENT: Status: ACTIVE | Noted: 2025-01-05

## 2025-01-05 PROBLEM — K21.9 GERD (GASTROESOPHAGEAL REFLUX DISEASE): Status: ACTIVE | Noted: 2025-01-05

## 2025-01-05 LAB
ALBUMIN SERPL-MCNC: 2.8 G/DL (ref 3.4–5)
ALBUMIN/GLOB SERPL: 0.9 {RATIO} (ref 1–2.4)
ALP SERPL-CCNC: 81 UNITS/L (ref 45–117)
ALT SERPL-CCNC: 14 UNITS/L
ANION GAP SERPL CALC-SCNC: 10 MMOL/L (ref 7–19)
AST SERPL-CCNC: 18 UNITS/L
BASOPHILS # BLD: 0 K/MCL (ref 0–0.3)
BASOPHILS NFR BLD: 0 %
BILIRUB SERPL-MCNC: 0.5 MG/DL (ref 0.2–1)
BUN SERPL-MCNC: 18 MG/DL (ref 6–20)
BUN/CREAT SERPL: 32 (ref 7–25)
CALCIUM SERPL-MCNC: 8.6 MG/DL (ref 8.4–10.2)
CHLORIDE SERPL-SCNC: 106 MMOL/L (ref 97–110)
CO2 SERPL-SCNC: 29 MMOL/L (ref 21–32)
CREAT SERPL-MCNC: 0.57 MG/DL (ref 0.51–0.95)
DEPRECATED RDW RBC: 40.4 FL (ref 39–50)
EGFRCR SERPLBLD CKD-EPI 2021: >90 ML/MIN/{1.73_M2}
EOSINOPHIL # BLD: 0.3 K/MCL (ref 0–0.5)
EOSINOPHIL NFR BLD: 5 %
ERYTHROCYTE [DISTWIDTH] IN BLOOD: 13 % (ref 11–15)
FASTING DURATION TIME PATIENT: ABNORMAL H
GLOBULIN SER-MCNC: 3 G/DL (ref 2–4)
GLUCOSE SERPL-MCNC: 97 MG/DL (ref 70–99)
HCT VFR BLD CALC: 27.9 % (ref 36–46.5)
HGB BLD-MCNC: 8.7 G/DL (ref 12–15.5)
IMM GRANULOCYTES # BLD AUTO: 0 K/MCL (ref 0–0.2)
IMM GRANULOCYTES # BLD: 0 %
LYMPHOCYTES # BLD: 1.5 K/MCL (ref 1–4)
LYMPHOCYTES NFR BLD: 24 %
MCH RBC QN AUTO: 26.6 PG (ref 26–34)
MCHC RBC AUTO-ENTMCNC: 31.2 G/DL (ref 32–36.5)
MCV RBC AUTO: 85.3 FL (ref 78–100)
MONOCYTES # BLD: 0.5 K/MCL (ref 0.3–0.9)
MONOCYTES NFR BLD: 9 %
NEUTROPHILS # BLD: 3.9 K/MCL (ref 1.8–7.7)
NEUTROPHILS NFR BLD: 62 %
NRBC BLD MANUAL-RTO: 0 /100 WBC
PLATELET # BLD AUTO: 231 K/MCL (ref 140–450)
POTASSIUM SERPL-SCNC: 4 MMOL/L (ref 3.4–5.1)
PROT SERPL-MCNC: 5.8 G/DL (ref 6.4–8.2)
RBC # BLD: 3.27 MIL/MCL (ref 4–5.2)
SODIUM SERPL-SCNC: 141 MMOL/L (ref 135–145)
WBC # BLD: 6.3 K/MCL (ref 4.2–11)

## 2025-01-05 PROCEDURE — 36415 COLL VENOUS BLD VENIPUNCTURE: CPT | Performed by: STUDENT IN AN ORGANIZED HEALTH CARE EDUCATION/TRAINING PROGRAM

## 2025-01-05 PROCEDURE — 10002803 HB RX 637: Performed by: STUDENT IN AN ORGANIZED HEALTH CARE EDUCATION/TRAINING PROGRAM

## 2025-01-05 PROCEDURE — 10004651 HB RX, NO CHARGE ITEM: Performed by: STUDENT IN AN ORGANIZED HEALTH CARE EDUCATION/TRAINING PROGRAM

## 2025-01-05 PROCEDURE — 10002800 HB RX 250 W HCPCS: Performed by: STUDENT IN AN ORGANIZED HEALTH CARE EDUCATION/TRAINING PROGRAM

## 2025-01-05 PROCEDURE — G0378 HOSPITAL OBSERVATION PER HR: HCPCS

## 2025-01-05 PROCEDURE — 99223 1ST HOSP IP/OBS HIGH 75: CPT | Performed by: INTERNAL MEDICINE

## 2025-01-05 PROCEDURE — 10002801 HB RX 250 W/O HCPCS: Performed by: STUDENT IN AN ORGANIZED HEALTH CARE EDUCATION/TRAINING PROGRAM

## 2025-01-05 PROCEDURE — 10002803 HB RX 637: Performed by: INTERNAL MEDICINE

## 2025-01-05 PROCEDURE — 10006031 HB ROOM CHARGE TELEMETRY

## 2025-01-05 PROCEDURE — 85025 COMPLETE CBC W/AUTO DIFF WBC: CPT | Performed by: STUDENT IN AN ORGANIZED HEALTH CARE EDUCATION/TRAINING PROGRAM

## 2025-01-05 PROCEDURE — 80053 COMPREHEN METABOLIC PANEL: CPT | Performed by: STUDENT IN AN ORGANIZED HEALTH CARE EDUCATION/TRAINING PROGRAM

## 2025-01-05 RX ORDER — ENOXAPARIN SODIUM 100 MG/ML
40 INJECTION SUBCUTANEOUS DAILY
Status: DISCONTINUED | OUTPATIENT
Start: 2025-01-05 | End: 2025-01-05 | Stop reason: ALTCHOICE

## 2025-01-05 RX ORDER — CALCIUM CARBONATE 500 MG/1
500 TABLET, CHEWABLE ORAL EVERY 4 HOURS PRN
Status: DISPENSED | OUTPATIENT
Start: 2025-01-05

## 2025-01-05 RX ORDER — ACETAMINOPHEN 650 MG/1
650 SUPPOSITORY RECTAL EVERY 4 HOURS PRN
Status: ACTIVE | OUTPATIENT
Start: 2025-01-05

## 2025-01-05 RX ORDER — HYDROCHLOROTHIAZIDE 25 MG/1
25 TABLET ORAL DAILY
Status: DISPENSED | OUTPATIENT
Start: 2025-01-05

## 2025-01-05 RX ORDER — ATORVASTATIN CALCIUM 20 MG/1
20 TABLET, FILM COATED ORAL NIGHTLY
Status: DISPENSED | OUTPATIENT
Start: 2025-01-05

## 2025-01-05 RX ORDER — POLYETHYLENE GLYCOL 3350 17 G/17G
17 POWDER, FOR SOLUTION ORAL DAILY PRN
Status: ACTIVE | OUTPATIENT
Start: 2025-01-05

## 2025-01-05 RX ORDER — METOPROLOL TARTRATE 25 MG/1
25 TABLET, FILM COATED ORAL 2 TIMES DAILY
Status: ON HOLD | COMMUNITY

## 2025-01-05 RX ORDER — CEPHALEXIN 500 MG/1
500 CAPSULE ORAL 2 TIMES DAILY
Status: ON HOLD | COMMUNITY

## 2025-01-05 RX ORDER — PANTOPRAZOLE SODIUM 40 MG/1
40 TABLET, DELAYED RELEASE ORAL
Status: DISPENSED | OUTPATIENT
Start: 2025-01-05

## 2025-01-05 RX ORDER — HYDROXYCHLOROQUINE SULFATE 200 MG/1
200 TABLET, FILM COATED ORAL DAILY
Status: ON HOLD | COMMUNITY

## 2025-01-05 RX ORDER — HYDROCHLOROTHIAZIDE 25 MG/1
25 TABLET ORAL DAILY
Status: ON HOLD | COMMUNITY

## 2025-01-05 RX ORDER — 0.9 % SODIUM CHLORIDE 0.9 %
2 VIAL (ML) INJECTION EVERY 12 HOURS SCHEDULED
Status: ACTIVE | OUTPATIENT
Start: 2025-01-05

## 2025-01-05 RX ORDER — AMOXICILLIN 250 MG
2 CAPSULE ORAL 2 TIMES DAILY PRN
Status: ACTIVE | OUTPATIENT
Start: 2025-01-05

## 2025-01-05 RX ORDER — POTASSIUM CHLORIDE 1500 MG/1
40 TABLET, EXTENDED RELEASE ORAL ONCE
Status: COMPLETED | OUTPATIENT
Start: 2025-01-05 | End: 2025-01-05

## 2025-01-05 RX ORDER — METOPROLOL TARTRATE 25 MG/1
25 TABLET, FILM COATED ORAL 2 TIMES DAILY
Status: DISPENSED | OUTPATIENT
Start: 2025-01-05

## 2025-01-05 RX ORDER — BISACODYL 10 MG
10 SUPPOSITORY, RECTAL RECTAL DAILY PRN
Status: ACTIVE | OUTPATIENT
Start: 2025-01-05

## 2025-01-05 RX ORDER — 0.9 % SODIUM CHLORIDE 0.9 %
10 VIAL (ML) INJECTION PRN
Status: ACTIVE | OUTPATIENT
Start: 2025-01-05

## 2025-01-05 RX ORDER — HYDROXYCHLOROQUINE SULFATE 200 MG/1
200 TABLET, FILM COATED ORAL DAILY
Status: DISPENSED | OUTPATIENT
Start: 2025-01-05

## 2025-01-05 RX ORDER — ACETAMINOPHEN 325 MG/1
650 TABLET ORAL EVERY 4 HOURS PRN
Status: ACTIVE | OUTPATIENT
Start: 2025-01-05

## 2025-01-05 RX ORDER — ACETAMINOPHEN AND CODEINE PHOSPHATE 300; 30 MG/1; MG/1
1 TABLET ORAL EVERY 4 HOURS PRN
Status: DISPENSED | OUTPATIENT
Start: 2025-01-05

## 2025-01-05 RX ORDER — ATORVASTATIN CALCIUM 20 MG/1
20 TABLET, FILM COATED ORAL NIGHTLY
Status: ON HOLD | COMMUNITY

## 2025-01-05 RX ORDER — FUROSEMIDE 20 MG/1
20 TABLET ORAL DAILY
Status: DISCONTINUED | OUTPATIENT
Start: 2025-01-05 | End: 2025-01-05

## 2025-01-05 RX ADMIN — METOPROLOL TARTRATE 25 MG: 25 TABLET, FILM COATED ORAL at 20:54

## 2025-01-05 RX ADMIN — SODIUM CHLORIDE, PRESERVATIVE FREE 2 ML: 5 INJECTION INTRAVENOUS at 20:56

## 2025-01-05 RX ADMIN — WATER 2000 MG: 1 INJECTION INTRAMUSCULAR; INTRAVENOUS; SUBCUTANEOUS at 20:56

## 2025-01-05 RX ADMIN — FUROSEMIDE 20 MG: 20 TABLET ORAL at 14:14

## 2025-01-05 RX ADMIN — HYDROCHLOROTHIAZIDE 25 MG: 25 TABLET ORAL at 17:39

## 2025-01-05 RX ADMIN — ACETAMINOPHEN AND CODEINE PHOSPHATE 1 TABLET: 300; 30 TABLET ORAL at 02:01

## 2025-01-05 RX ADMIN — ANTACID TABLETS 500 MG: 500 TABLET, CHEWABLE ORAL at 20:16

## 2025-01-05 RX ADMIN — ACETAMINOPHEN AND CODEINE PHOSPHATE 1 TABLET: 300; 30 TABLET ORAL at 14:17

## 2025-01-05 RX ADMIN — POTASSIUM CHLORIDE 40 MEQ: 1500 TABLET, EXTENDED RELEASE ORAL at 02:00

## 2025-01-05 RX ADMIN — APIXABAN 5 MG: 5 TABLET, FILM COATED ORAL at 14:14

## 2025-01-05 RX ADMIN — ACETAMINOPHEN AND CODEINE PHOSPHATE 1 TABLET: 300; 30 TABLET ORAL at 10:29

## 2025-01-05 RX ADMIN — ATORVASTATIN CALCIUM 20 MG: 20 TABLET, FILM COATED ORAL at 20:17

## 2025-01-05 RX ADMIN — ACETAMINOPHEN AND CODEINE PHOSPHATE 1 TABLET: 300; 30 TABLET ORAL at 20:54

## 2025-01-05 RX ADMIN — PANTOPRAZOLE SODIUM 40 MG: 40 TABLET, DELAYED RELEASE ORAL at 17:39

## 2025-01-05 RX ADMIN — APIXABAN 5 MG: 5 TABLET, FILM COATED ORAL at 20:17

## 2025-01-05 RX ADMIN — SODIUM CHLORIDE, PRESERVATIVE FREE 2 ML: 5 INJECTION INTRAVENOUS at 01:00

## 2025-01-05 RX ADMIN — ACETAMINOPHEN AND CODEINE PHOSPHATE 1 TABLET: 300; 30 TABLET ORAL at 06:15

## 2025-01-05 RX ADMIN — HYDROXYCHLOROQUINE SULFATE 200 MG: 200 TABLET, FILM COATED ORAL at 17:39

## 2025-01-05 SDOH — ECONOMIC STABILITY: INCOME INSECURITY: IN THE PAST 12 MONTHS, HAS THE ELECTRIC, GAS, OIL, OR WATER COMPANY THREATENED TO SHUT OFF SERVICE IN YOUR HOME?: NO

## 2025-01-05 SDOH — ECONOMIC STABILITY: HOUSING INSECURITY: WHAT IS YOUR LIVING SITUATION TODAY?: I HAVE A STEADY PLACE TO LIVE

## 2025-01-05 SDOH — SOCIAL STABILITY: SOCIAL NETWORK
HOW OFTEN DO YOU SEE OR TALK TO PEOPLE THAT YOU CARE ABOUT AND FEEL CLOSE TO? (FOR EXAMPLE: TALKING TO FRIENDS ON THE PHONE, VISITING FRIENDS OR FAMILY, GOING TO CHURCH OR CLUB MEETINGS): 5 OR MORE TIMES A WEEK

## 2025-01-05 SDOH — HEALTH STABILITY: PHYSICAL HEALTH: DO YOU HAVE DIFFICULTY DRESSING OR BATHING?: NO

## 2025-01-05 SDOH — ECONOMIC STABILITY: HOUSING INSECURITY: DO YOU HAVE PROBLEMS WITH ANY OF THE FOLLOWING?: NONE OF THE ABOVE

## 2025-01-05 SDOH — ECONOMIC STABILITY: HOUSING INSECURITY: WHAT IS YOUR LIVING SITUATION TODAY?: ALONE

## 2025-01-05 SDOH — HEALTH STABILITY: PHYSICAL HEALTH: DO YOU HAVE SERIOUS DIFFICULTY WALKING OR CLIMBING STAIRS?: NO

## 2025-01-05 SDOH — SOCIAL STABILITY: SOCIAL INSECURITY: HOW OFTEN DOES ANYONE, INCLUDING FAMILY AND FRIENDS, INSULT OR TALK DOWN TO YOU?: NEVER

## 2025-01-05 SDOH — SOCIAL STABILITY: SOCIAL NETWORK: SUPPORT SYSTEMS: SIBLINGS

## 2025-01-05 SDOH — ECONOMIC STABILITY: GENERAL: WOULD YOU LIKE HELP WITH ANY OF THE FOLLOWING NEEDS?: I DON'T WANT HELP WITH ANY OF THESE

## 2025-01-05 SDOH — ECONOMIC STABILITY: FOOD INSECURITY: WITHIN THE PAST 12 MONTHS, THE FOOD YOU BOUGHT JUST DIDN'T LAST AND YOU DIDN'T HAVE MONEY TO GET MORE.: NEVER TRUE

## 2025-01-05 SDOH — SOCIAL STABILITY: SOCIAL INSECURITY: HOW OFTEN DOES ANYONE, INCLUDING FAMILY AND FRIENDS, THREATEN YOU WITH HARM?: NEVER

## 2025-01-05 SDOH — SOCIAL STABILITY: SOCIAL INSECURITY: HOW OFTEN DOES ANYONE, INCLUDING FAMILY AND FRIENDS, PHYSICALLY HURT YOU?: NEVER

## 2025-01-05 SDOH — ECONOMIC STABILITY: GENERAL

## 2025-01-05 SDOH — SOCIAL STABILITY: SOCIAL INSECURITY: HOW OFTEN DOES ANYONE, INCLUDING FAMILY AND FRIENDS, SCREAM OR CURSE AT YOU?: NEVER

## 2025-01-05 SDOH — ECONOMIC STABILITY: HOUSING INSECURITY: WHAT IS YOUR LIVING SITUATION TODAY?: HOUSE

## 2025-01-05 SDOH — ECONOMIC STABILITY: TRANSPORTATION INSECURITY
IN THE PAST 12 MONTHS, HAS LACK OF RELIABLE TRANSPORTATION KEPT YOU FROM MEDICAL APPOINTMENTS, MEETINGS, WORK OR FROM GETTING THINGS NEEDED FOR DAILY LIVING?: NO

## 2025-01-05 SDOH — HEALTH STABILITY: GENERAL
BECAUSE OF A PHYSICAL, MENTAL, OR EMOTIONAL CONDITION, DO YOU HAVE SERIOUS DIFFICULTY CONCENTRATING, REMEMBERING OR MAKING DECISIONS?: NO

## 2025-01-05 SDOH — HEALTH STABILITY: GENERAL: BECAUSE OF A PHYSICAL, MENTAL, OR EMOTIONAL CONDITION, DO YOU HAVE DIFFICULTY DOING ERRANDS ALONE?: NO

## 2025-01-05 ASSESSMENT — COLUMBIA-SUICIDE SEVERITY RATING SCALE - C-SSRS
1. WITHIN THE PAST MONTH, HAVE YOU WISHED YOU WERE DEAD OR WISHED YOU COULD GO TO SLEEP AND NOT WAKE UP?: NO
IS THE PATIENT ABLE TO COMPLETE C-SSRS: YES
2. HAVE YOU ACTUALLY HAD ANY THOUGHTS OF KILLING YOURSELF?: NO

## 2025-01-05 ASSESSMENT — LIFESTYLE VARIABLES
AUDIT-C TOTAL SCORE: 1
HOW OFTEN DO YOU HAVE 6 OR MORE DRINKS ON ONE OCCASION: NEVER
HOW OFTEN DO YOU HAVE A DRINK CONTAINING ALCOHOL: MONTHLY OR LESS
ALCOHOL_USE_STATUS: NO OR LOW RISK WITH VALIDATED TOOL
HOW MANY STANDARD DRINKS CONTAINING ALCOHOL DO YOU HAVE ON A TYPICAL DAY: 0,1 OR 2

## 2025-01-05 ASSESSMENT — PATIENT HEALTH QUESTIONNAIRE - PHQ9
SUM OF ALL RESPONSES TO PHQ9 QUESTIONS 1 AND 2: 0
2. FEELING DOWN, DEPRESSED OR HOPELESS: NOT AT ALL
CLINICAL INTERPRETATION OF PHQ2 SCORE: NO FURTHER SCREENING NEEDED
SUM OF ALL RESPONSES TO PHQ9 QUESTIONS 1 AND 2: 0
IS PATIENT ABLE TO COMPLETE PHQ2 OR PHQ9: YES
1. LITTLE INTEREST OR PLEASURE IN DOING THINGS: NOT AT ALL

## 2025-01-05 ASSESSMENT — PAIN SCALES - GENERAL
PAINLEVEL_OUTOF10: 6
PAINLEVEL_OUTOF10: 2
PAINLEVEL_OUTOF10: 5
PAINLEVEL_OUTOF10: 3
PAINLEVEL_OUTOF10: 3
PAINLEVEL_OUTOF10: 5
PAINLEVEL_OUTOF10: 7
PAINLEVEL_OUTOF10: 4
PAINLEVEL_OUTOF10: 5
PAINLEVEL_OUTOF10: 6
PAINLEVEL_OUTOF10: 3

## 2025-01-05 ASSESSMENT — ACTIVITIES OF DAILY LIVING (ADL)
ADL_BEFORE_ADMISSION: INDEPENDENT
ADL_SHORT_OF_BREATH: NO
RECENT_DECLINE_ADL: NO
ADL_SCORE: 12

## 2025-01-05 ASSESSMENT — ORIENTATION MEMORY CONCENTRATION TEST (OMCT)
COUNT BACKWARDS FROM 20 TO 1: CORRECT
OMCT SCORE: 0
OMCT INTERPRETATION: 0-6: NO SIGNIFICANT IMPAIRMENT
REPEAT THE NAME AND ADDRESS I ASKED YOU TO REMEMBER: CORRECT
SAY THE MONTHS IN REVERSE ORDER STARTING WITH LAST MONTH: CORRECT
WHAT YEAR IS IT NOW (MUST BE EXACT): CORRECT
WHAT TIME IS IT (NO WATCH OR CLOCK): CORRECT
WHAT MONTH IS IT NOW: CORRECT

## 2025-01-06 PROBLEM — D50.9 IRON DEFICIENCY ANEMIA: Status: ACTIVE | Noted: 2025-01-06

## 2025-01-06 LAB
ALBUMIN SERPL-MCNC: 3 G/DL (ref 3.4–5)
ALBUMIN/GLOB SERPL: 0.9 {RATIO} (ref 1–2.4)
ALP SERPL-CCNC: 84 UNITS/L (ref 45–117)
ALT SERPL-CCNC: 17 UNITS/L
ANION GAP SERPL CALC-SCNC: 11 MMOL/L (ref 7–19)
AST SERPL-CCNC: 15 UNITS/L
BASOPHILS # BLD: 0 K/MCL (ref 0–0.3)
BASOPHILS NFR BLD: 0 %
BILIRUB SERPL-MCNC: 0.6 MG/DL (ref 0.2–1)
BUN SERPL-MCNC: 15 MG/DL (ref 6–20)
BUN/CREAT SERPL: 26 (ref 7–25)
CALCIUM SERPL-MCNC: 9.3 MG/DL (ref 8.4–10.2)
CHLORIDE SERPL-SCNC: 104 MMOL/L (ref 97–110)
CO2 SERPL-SCNC: 30 MMOL/L (ref 21–32)
CREAT SERPL-MCNC: 0.57 MG/DL (ref 0.51–0.95)
CRP SERPL-MCNC: 5.2 MG/L
DEPRECATED RDW RBC: 41.4 FL (ref 39–50)
EGFRCR SERPLBLD CKD-EPI 2021: >90 ML/MIN/{1.73_M2}
EOSINOPHIL # BLD: 0.4 K/MCL (ref 0–0.5)
EOSINOPHIL NFR BLD: 9 %
ERYTHROCYTE [DISTWIDTH] IN BLOOD: 13 % (ref 11–15)
ERYTHROCYTE [SEDIMENTATION RATE] IN BLOOD BY WESTERGREN METHOD: 34 MM/HR (ref 0–20)
FASTING DURATION TIME PATIENT: ABNORMAL H
FERRITIN SERPL-MCNC: 31 NG/ML (ref 8–252)
GLOBULIN SER-MCNC: 3.2 G/DL (ref 2–4)
GLUCOSE SERPL-MCNC: 95 MG/DL (ref 70–99)
HCT VFR BLD CALC: 30.7 % (ref 36–46.5)
HGB BLD-MCNC: 9.5 G/DL (ref 12–15.5)
IMM GRANULOCYTES # BLD AUTO: 0 K/MCL (ref 0–0.2)
IMM GRANULOCYTES # BLD: 0 %
IRON SATN MFR SERPL: 9 % (ref 15–45)
IRON SERPL-MCNC: 23 MCG/DL (ref 50–170)
LYMPHOCYTES # BLD: 1.3 K/MCL (ref 1–4)
LYMPHOCYTES NFR BLD: 28 %
MAGNESIUM SERPL-MCNC: 2 MG/DL (ref 1.7–2.4)
MCH RBC QN AUTO: 27 PG (ref 26–34)
MCHC RBC AUTO-ENTMCNC: 30.9 G/DL (ref 32–36.5)
MCV RBC AUTO: 87.2 FL (ref 78–100)
MONOCYTES # BLD: 0.4 K/MCL (ref 0.3–0.9)
MONOCYTES NFR BLD: 9 %
NEUTROPHILS # BLD: 2.5 K/MCL (ref 1.8–7.7)
NEUTROPHILS NFR BLD: 54 %
NRBC BLD MANUAL-RTO: 0 /100 WBC
PLATELET # BLD AUTO: 239 K/MCL (ref 140–450)
POTASSIUM SERPL-SCNC: 4.4 MMOL/L (ref 3.4–5.1)
PROT SERPL-MCNC: 6.2 G/DL (ref 6.4–8.2)
RBC # BLD: 3.52 MIL/MCL (ref 4–5.2)
SODIUM SERPL-SCNC: 141 MMOL/L (ref 135–145)
TIBC SERPL-MCNC: 265 MCG/DL (ref 250–450)
WBC # BLD: 4.8 K/MCL (ref 4.2–11)

## 2025-01-06 PROCEDURE — 83735 ASSAY OF MAGNESIUM: CPT | Performed by: INTERNAL MEDICINE

## 2025-01-06 PROCEDURE — 85652 RBC SED RATE AUTOMATED: CPT | Performed by: INTERNAL MEDICINE

## 2025-01-06 PROCEDURE — 36415 COLL VENOUS BLD VENIPUNCTURE: CPT | Performed by: STUDENT IN AN ORGANIZED HEALTH CARE EDUCATION/TRAINING PROGRAM

## 2025-01-06 PROCEDURE — 10002803 HB RX 637: Performed by: STUDENT IN AN ORGANIZED HEALTH CARE EDUCATION/TRAINING PROGRAM

## 2025-01-06 PROCEDURE — 80053 COMPREHEN METABOLIC PANEL: CPT | Performed by: STUDENT IN AN ORGANIZED HEALTH CARE EDUCATION/TRAINING PROGRAM

## 2025-01-06 PROCEDURE — 99233 SBSQ HOSP IP/OBS HIGH 50: CPT | Performed by: INTERNAL MEDICINE

## 2025-01-06 PROCEDURE — 10002801 HB RX 250 W/O HCPCS: Performed by: STUDENT IN AN ORGANIZED HEALTH CARE EDUCATION/TRAINING PROGRAM

## 2025-01-06 PROCEDURE — 83540 ASSAY OF IRON: CPT | Performed by: INTERNAL MEDICINE

## 2025-01-06 PROCEDURE — 10004651 HB RX, NO CHARGE ITEM: Performed by: STUDENT IN AN ORGANIZED HEALTH CARE EDUCATION/TRAINING PROGRAM

## 2025-01-06 PROCEDURE — 10002800 HB RX 250 W HCPCS: Performed by: STUDENT IN AN ORGANIZED HEALTH CARE EDUCATION/TRAINING PROGRAM

## 2025-01-06 PROCEDURE — 10002803 HB RX 637: Performed by: INTERNAL MEDICINE

## 2025-01-06 PROCEDURE — 85025 COMPLETE CBC W/AUTO DIFF WBC: CPT | Performed by: STUDENT IN AN ORGANIZED HEALTH CARE EDUCATION/TRAINING PROGRAM

## 2025-01-06 PROCEDURE — 82728 ASSAY OF FERRITIN: CPT | Performed by: INTERNAL MEDICINE

## 2025-01-06 PROCEDURE — 10006031 HB ROOM CHARGE TELEMETRY

## 2025-01-06 PROCEDURE — 86140 C-REACTIVE PROTEIN: CPT | Performed by: INTERNAL MEDICINE

## 2025-01-06 RX ORDER — FLUTICASONE PROPIONATE 50 MCG
1 SPRAY, SUSPENSION (ML) NASAL DAILY
COMMUNITY

## 2025-01-06 RX ORDER — LORAZEPAM 0.5 MG/1
0.5 TABLET ORAL
COMMUNITY

## 2025-01-06 RX ORDER — FLAXSEED OIL 1000 MG
1 CAPSULE ORAL DAILY
COMMUNITY

## 2025-01-06 RX ORDER — GABAPENTIN 300 MG/1
1 CAPSULE ORAL EVERY 6 HOURS
Status: ON HOLD | COMMUNITY
End: 2025-01-09

## 2025-01-06 RX ORDER — ACETAMINOPHEN AND CODEINE PHOSPHATE 300; 30 MG/1; MG/1
1 TABLET ORAL EVERY 6 HOURS PRN
COMMUNITY

## 2025-01-06 RX ORDER — ANTIOX #8/OM3/DHA/EPA/LUT/ZEAX 250-2.5 MG
CAPSULE ORAL
COMMUNITY

## 2025-01-06 RX ORDER — ACETAMINOPHEN 325 MG/1
2 TABLET ORAL EVERY 6 HOURS PRN
COMMUNITY

## 2025-01-06 RX ORDER — CLINDAMYCIN PHOSPHATE 600 MG/50ML
600 INJECTION, SOLUTION INTRAVENOUS EVERY 8 HOURS SCHEDULED
Status: DISCONTINUED | OUTPATIENT
Start: 2025-01-06 | End: 2025-01-09

## 2025-01-06 RX ORDER — MECOBALAMIN 5000 MCG
15 TABLET,DISINTEGRATING ORAL DAILY
COMMUNITY

## 2025-01-06 RX ORDER — CLOBETASOL PROPIONATE 0.5 MG/G
OINTMENT TOPICAL 2 TIMES DAILY
COMMUNITY

## 2025-01-06 RX ORDER — CAPSAICIN 0.025 %
CREAM (GRAM) TOPICAL 3 TIMES DAILY
COMMUNITY

## 2025-01-06 RX ORDER — MIRABEGRON 25 MG/1
25 TABLET, FILM COATED, EXTENDED RELEASE ORAL DAILY
COMMUNITY

## 2025-01-06 RX ORDER — NITROGLYCERIN 0.4 MG/1
0.4 TABLET SUBLINGUAL EVERY 5 MIN PRN
COMMUNITY

## 2025-01-06 RX ADMIN — ACETAMINOPHEN AND CODEINE PHOSPHATE 1 TABLET: 300; 30 TABLET ORAL at 23:02

## 2025-01-06 RX ADMIN — HYDROCHLOROTHIAZIDE 25 MG: 25 TABLET ORAL at 08:05

## 2025-01-06 RX ADMIN — WATER 2000 MG: 1 INJECTION INTRAMUSCULAR; INTRAVENOUS; SUBCUTANEOUS at 23:04

## 2025-01-06 RX ADMIN — PANTOPRAZOLE SODIUM 40 MG: 40 TABLET, DELAYED RELEASE ORAL at 05:59

## 2025-01-06 RX ADMIN — SODIUM CHLORIDE, PRESERVATIVE FREE 2 ML: 5 INJECTION INTRAVENOUS at 23:08

## 2025-01-06 RX ADMIN — ATORVASTATIN CALCIUM 20 MG: 20 TABLET, FILM COATED ORAL at 23:02

## 2025-01-06 RX ADMIN — HYDROXYCHLOROQUINE SULFATE 200 MG: 200 TABLET, FILM COATED ORAL at 08:05

## 2025-01-06 RX ADMIN — METOPROLOL TARTRATE 25 MG: 25 TABLET, FILM COATED ORAL at 23:02

## 2025-01-06 RX ADMIN — APIXABAN 5 MG: 5 TABLET, FILM COATED ORAL at 08:05

## 2025-01-06 RX ADMIN — ACETAMINOPHEN AND CODEINE PHOSPHATE 1 TABLET: 300; 30 TABLET ORAL at 13:40

## 2025-01-06 RX ADMIN — METOPROLOL TARTRATE 25 MG: 25 TABLET, FILM COATED ORAL at 08:05

## 2025-01-06 RX ADMIN — SODIUM CHLORIDE, PRESERVATIVE FREE 2 ML: 5 INJECTION INTRAVENOUS at 08:08

## 2025-01-06 RX ADMIN — APIXABAN 5 MG: 5 TABLET, FILM COATED ORAL at 23:01

## 2025-01-06 SDOH — ECONOMIC STABILITY: HOUSING INSECURITY: DO YOU HAVE PROBLEMS WITH ANY OF THE FOLLOWING?: NONE OF THE ABOVE

## 2025-01-06 ASSESSMENT — PAIN SCALES - GENERAL
PAINLEVEL_OUTOF10: 0
PAINLEVEL_OUTOF10: 2
PAINLEVEL_OUTOF10: 4
PAINLEVEL_OUTOF10: 7
PAINLEVEL_OUTOF10: 5

## 2025-01-06 ASSESSMENT — COGNITIVE AND FUNCTIONAL STATUS - GENERAL
DO YOU HAVE SERIOUS DIFFICULTY WALKING OR CLIMBING STAIRS: YES
DO YOU HAVE DIFFICULTY DRESSING OR BATHING: NO
BECAUSE OF A PHYSICAL, MENTAL, OR EMOTIONAL CONDITION, DO YOU HAVE SERIOUS DIFFICULTY CONCENTRATING, REMEMBERING OR MAKING DECISIONS: NO

## 2025-01-06 ASSESSMENT — PAIN SCALES - WONG BAKER: WONGBAKER_NUMERICALRESPONSE: 0

## 2025-01-07 LAB
ALBUMIN SERPL-MCNC: 3 G/DL (ref 3.4–5)
ALBUMIN/GLOB SERPL: 1 {RATIO} (ref 1–2.4)
ALP SERPL-CCNC: 81 UNITS/L (ref 45–117)
ALT SERPL-CCNC: 17 UNITS/L
ANION GAP SERPL CALC-SCNC: 10 MMOL/L (ref 7–19)
AST SERPL-CCNC: 13 UNITS/L
BASOPHILS # BLD: 0 K/MCL (ref 0–0.3)
BASOPHILS NFR BLD: 0 %
BILIRUB SERPL-MCNC: 0.5 MG/DL (ref 0.2–1)
BUN SERPL-MCNC: 15 MG/DL (ref 6–20)
BUN/CREAT SERPL: 25 (ref 7–25)
CALCIUM SERPL-MCNC: 9.3 MG/DL (ref 8.4–10.2)
CHLORIDE SERPL-SCNC: 104 MMOL/L (ref 97–110)
CO2 SERPL-SCNC: 30 MMOL/L (ref 21–32)
CREAT SERPL-MCNC: 0.6 MG/DL (ref 0.51–0.95)
DEPRECATED RDW RBC: 41.1 FL (ref 39–50)
EGFRCR SERPLBLD CKD-EPI 2021: >90 ML/MIN/{1.73_M2}
EOSINOPHIL # BLD: 0.4 K/MCL (ref 0–0.5)
EOSINOPHIL NFR BLD: 8 %
ERYTHROCYTE [DISTWIDTH] IN BLOOD: 12.9 % (ref 11–15)
FASTING DURATION TIME PATIENT: ABNORMAL H
GLOBULIN SER-MCNC: 3.1 G/DL (ref 2–4)
GLUCOSE SERPL-MCNC: 95 MG/DL (ref 70–99)
HCT VFR BLD CALC: 30.7 % (ref 36–46.5)
HGB BLD-MCNC: 9.5 G/DL (ref 12–15.5)
IMM GRANULOCYTES # BLD AUTO: 0 K/MCL (ref 0–0.2)
IMM GRANULOCYTES # BLD: 1 %
LYMPHOCYTES # BLD: 1.5 K/MCL (ref 1–4)
LYMPHOCYTES NFR BLD: 31 %
MAGNESIUM SERPL-MCNC: 2 MG/DL (ref 1.7–2.4)
MCH RBC QN AUTO: 26.8 PG (ref 26–34)
MCHC RBC AUTO-ENTMCNC: 30.9 G/DL (ref 32–36.5)
MCV RBC AUTO: 86.7 FL (ref 78–100)
MONOCYTES # BLD: 0.5 K/MCL (ref 0.3–0.9)
MONOCYTES NFR BLD: 11 %
NEUTROPHILS # BLD: 2.4 K/MCL (ref 1.8–7.7)
NEUTROPHILS NFR BLD: 49 %
NRBC BLD MANUAL-RTO: 0 /100 WBC
PLATELET # BLD AUTO: 256 K/MCL (ref 140–450)
POTASSIUM SERPL-SCNC: 4.2 MMOL/L (ref 3.4–5.1)
PROT SERPL-MCNC: 6.1 G/DL (ref 6.4–8.2)
RBC # BLD: 3.54 MIL/MCL (ref 4–5.2)
SODIUM SERPL-SCNC: 140 MMOL/L (ref 135–145)
WBC # BLD: 4.9 K/MCL (ref 4.2–11)

## 2025-01-07 PROCEDURE — 36415 COLL VENOUS BLD VENIPUNCTURE: CPT | Performed by: STUDENT IN AN ORGANIZED HEALTH CARE EDUCATION/TRAINING PROGRAM

## 2025-01-07 PROCEDURE — 80053 COMPREHEN METABOLIC PANEL: CPT | Performed by: STUDENT IN AN ORGANIZED HEALTH CARE EDUCATION/TRAINING PROGRAM

## 2025-01-07 PROCEDURE — 10002800 HB RX 250 W HCPCS: Performed by: INTERNAL MEDICINE

## 2025-01-07 PROCEDURE — 10006031 HB ROOM CHARGE TELEMETRY

## 2025-01-07 PROCEDURE — 83735 ASSAY OF MAGNESIUM: CPT | Performed by: STUDENT IN AN ORGANIZED HEALTH CARE EDUCATION/TRAINING PROGRAM

## 2025-01-07 PROCEDURE — 10002803 HB RX 637: Performed by: STUDENT IN AN ORGANIZED HEALTH CARE EDUCATION/TRAINING PROGRAM

## 2025-01-07 PROCEDURE — 99233 SBSQ HOSP IP/OBS HIGH 50: CPT | Performed by: STUDENT IN AN ORGANIZED HEALTH CARE EDUCATION/TRAINING PROGRAM

## 2025-01-07 PROCEDURE — 10002803 HB RX 637: Performed by: INTERNAL MEDICINE

## 2025-01-07 PROCEDURE — 10002800 HB RX 250 W HCPCS: Performed by: STUDENT IN AN ORGANIZED HEALTH CARE EDUCATION/TRAINING PROGRAM

## 2025-01-07 PROCEDURE — 10002801 HB RX 250 W/O HCPCS: Performed by: STUDENT IN AN ORGANIZED HEALTH CARE EDUCATION/TRAINING PROGRAM

## 2025-01-07 PROCEDURE — 97161 PT EVAL LOW COMPLEX 20 MIN: CPT

## 2025-01-07 PROCEDURE — 10004651 HB RX, NO CHARGE ITEM: Performed by: STUDENT IN AN ORGANIZED HEALTH CARE EDUCATION/TRAINING PROGRAM

## 2025-01-07 PROCEDURE — 97530 THERAPEUTIC ACTIVITIES: CPT

## 2025-01-07 PROCEDURE — 85025 COMPLETE CBC W/AUTO DIFF WBC: CPT | Performed by: STUDENT IN AN ORGANIZED HEALTH CARE EDUCATION/TRAINING PROGRAM

## 2025-01-07 RX ADMIN — HYDROCHLOROTHIAZIDE 25 MG: 25 TABLET ORAL at 08:53

## 2025-01-07 RX ADMIN — CLINDAMYCIN IN 5 PERCENT DEXTROSE 600 MG: 12 INJECTION, SOLUTION INTRAVENOUS at 11:52

## 2025-01-07 RX ADMIN — WATER 2000 MG: 1 INJECTION INTRAMUSCULAR; INTRAVENOUS; SUBCUTANEOUS at 21:17

## 2025-01-07 RX ADMIN — CLINDAMYCIN IN 5 PERCENT DEXTROSE 600 MG: 12 INJECTION, SOLUTION INTRAVENOUS at 20:20

## 2025-01-07 RX ADMIN — HYDROXYCHLOROQUINE SULFATE 200 MG: 200 TABLET, FILM COATED ORAL at 08:52

## 2025-01-07 RX ADMIN — PANTOPRAZOLE SODIUM 40 MG: 40 TABLET, DELAYED RELEASE ORAL at 06:43

## 2025-01-07 RX ADMIN — ACETAMINOPHEN AND CODEINE PHOSPHATE 1 TABLET: 300; 30 TABLET ORAL at 14:30

## 2025-01-07 RX ADMIN — SODIUM CHLORIDE, PRESERVATIVE FREE 2 ML: 5 INJECTION INTRAVENOUS at 08:55

## 2025-01-07 RX ADMIN — SODIUM CHLORIDE, PRESERVATIVE FREE 2 ML: 5 INJECTION INTRAVENOUS at 20:39

## 2025-01-07 RX ADMIN — APIXABAN 5 MG: 5 TABLET, FILM COATED ORAL at 08:52

## 2025-01-07 RX ADMIN — METOPROLOL TARTRATE 25 MG: 25 TABLET, FILM COATED ORAL at 08:52

## 2025-01-07 RX ADMIN — ATORVASTATIN CALCIUM 20 MG: 20 TABLET, FILM COATED ORAL at 20:36

## 2025-01-07 RX ADMIN — METOPROLOL TARTRATE 25 MG: 25 TABLET, FILM COATED ORAL at 20:36

## 2025-01-07 RX ADMIN — APIXABAN 5 MG: 5 TABLET, FILM COATED ORAL at 20:36

## 2025-01-07 ASSESSMENT — PAIN SCALES - GENERAL
PAINLEVEL_OUTOF10: 0
PAINLEVEL_OUTOF10: 5
PAINLEVEL_OUTOF10: 0
PAINLEVEL_OUTOF10: 3

## 2025-01-07 ASSESSMENT — COGNITIVE AND FUNCTIONAL STATUS - GENERAL
BASIC_MOBILITY_RAW_SCORE: 22
BASIC_MOBILITY_CONVERTED_SCORE: 47.40

## 2025-01-07 ASSESSMENT — ENCOUNTER SYMPTOMS: PAIN SEVERITY NOW: 5

## 2025-01-08 LAB
ALBUMIN SERPL-MCNC: 3.1 G/DL (ref 3.4–5)
ALBUMIN/GLOB SERPL: 1 {RATIO} (ref 1–2.4)
ALP SERPL-CCNC: 83 UNITS/L (ref 45–117)
ALT SERPL-CCNC: 16 UNITS/L
ANION GAP SERPL CALC-SCNC: 10 MMOL/L (ref 7–19)
AST SERPL-CCNC: 16 UNITS/L
BASOPHILS # BLD: 0 K/MCL (ref 0–0.3)
BASOPHILS NFR BLD: 0 %
BILIRUB SERPL-MCNC: 0.6 MG/DL (ref 0.2–1)
BUN SERPL-MCNC: 18 MG/DL (ref 6–20)
BUN/CREAT SERPL: 34 (ref 7–25)
CALCIUM SERPL-MCNC: 9.2 MG/DL (ref 8.4–10.2)
CHLORIDE SERPL-SCNC: 103 MMOL/L (ref 97–110)
CO2 SERPL-SCNC: 28 MMOL/L (ref 21–32)
CREAT SERPL-MCNC: 0.53 MG/DL (ref 0.51–0.95)
DEPRECATED RDW RBC: 40.7 FL (ref 39–50)
EGFRCR SERPLBLD CKD-EPI 2021: >90 ML/MIN/{1.73_M2}
EOSINOPHIL # BLD: 0.4 K/MCL (ref 0–0.5)
EOSINOPHIL NFR BLD: 7 %
ERYTHROCYTE [DISTWIDTH] IN BLOOD: 12.9 % (ref 11–15)
FASTING DURATION TIME PATIENT: ABNORMAL H
GLOBULIN SER-MCNC: 3.2 G/DL (ref 2–4)
GLUCOSE SERPL-MCNC: 94 MG/DL (ref 70–99)
HCT VFR BLD CALC: 32.1 % (ref 36–46.5)
HGB BLD-MCNC: 9.9 G/DL (ref 12–15.5)
IMM GRANULOCYTES # BLD AUTO: 0 K/MCL (ref 0–0.2)
IMM GRANULOCYTES # BLD: 1 %
LYMPHOCYTES # BLD: 1.2 K/MCL (ref 1–4)
LYMPHOCYTES NFR BLD: 24 %
MCH RBC QN AUTO: 26.8 PG (ref 26–34)
MCHC RBC AUTO-ENTMCNC: 30.8 G/DL (ref 32–36.5)
MCV RBC AUTO: 87 FL (ref 78–100)
MONOCYTES # BLD: 0.4 K/MCL (ref 0.3–0.9)
MONOCYTES NFR BLD: 9 %
NEUTROPHILS # BLD: 2.9 K/MCL (ref 1.8–7.7)
NEUTROPHILS NFR BLD: 59 %
NRBC BLD MANUAL-RTO: 0 /100 WBC
PLATELET # BLD AUTO: 234 K/MCL (ref 140–450)
POTASSIUM SERPL-SCNC: 3.9 MMOL/L (ref 3.4–5.1)
PROT SERPL-MCNC: 6.3 G/DL (ref 6.4–8.2)
RBC # BLD: 3.69 MIL/MCL (ref 4–5.2)
SODIUM SERPL-SCNC: 137 MMOL/L (ref 135–145)
WBC # BLD: 5 K/MCL (ref 4.2–11)

## 2025-01-08 PROCEDURE — 10006031 HB ROOM CHARGE TELEMETRY

## 2025-01-08 PROCEDURE — 10002800 HB RX 250 W HCPCS: Performed by: INTERNAL MEDICINE

## 2025-01-08 PROCEDURE — 36415 COLL VENOUS BLD VENIPUNCTURE: CPT | Performed by: STUDENT IN AN ORGANIZED HEALTH CARE EDUCATION/TRAINING PROGRAM

## 2025-01-08 PROCEDURE — 80053 COMPREHEN METABOLIC PANEL: CPT | Performed by: STUDENT IN AN ORGANIZED HEALTH CARE EDUCATION/TRAINING PROGRAM

## 2025-01-08 PROCEDURE — 10002801 HB RX 250 W/O HCPCS: Performed by: STUDENT IN AN ORGANIZED HEALTH CARE EDUCATION/TRAINING PROGRAM

## 2025-01-08 PROCEDURE — 10004651 HB RX, NO CHARGE ITEM: Performed by: STUDENT IN AN ORGANIZED HEALTH CARE EDUCATION/TRAINING PROGRAM

## 2025-01-08 PROCEDURE — 10002803 HB RX 637: Performed by: STUDENT IN AN ORGANIZED HEALTH CARE EDUCATION/TRAINING PROGRAM

## 2025-01-08 PROCEDURE — 10002800 HB RX 250 W HCPCS: Performed by: STUDENT IN AN ORGANIZED HEALTH CARE EDUCATION/TRAINING PROGRAM

## 2025-01-08 PROCEDURE — 10002803 HB RX 637: Performed by: INTERNAL MEDICINE

## 2025-01-08 PROCEDURE — 85025 COMPLETE CBC W/AUTO DIFF WBC: CPT | Performed by: STUDENT IN AN ORGANIZED HEALTH CARE EDUCATION/TRAINING PROGRAM

## 2025-01-08 PROCEDURE — 99233 SBSQ HOSP IP/OBS HIGH 50: CPT | Performed by: STUDENT IN AN ORGANIZED HEALTH CARE EDUCATION/TRAINING PROGRAM

## 2025-01-08 RX ADMIN — APIXABAN 5 MG: 5 TABLET, FILM COATED ORAL at 23:12

## 2025-01-08 RX ADMIN — ATORVASTATIN CALCIUM 20 MG: 20 TABLET, FILM COATED ORAL at 23:12

## 2025-01-08 RX ADMIN — METOPROLOL TARTRATE 25 MG: 25 TABLET, FILM COATED ORAL at 08:42

## 2025-01-08 RX ADMIN — HYDROXYCHLOROQUINE SULFATE 200 MG: 200 TABLET, FILM COATED ORAL at 08:42

## 2025-01-08 RX ADMIN — CLINDAMYCIN IN 5 PERCENT DEXTROSE 600 MG: 12 INJECTION, SOLUTION INTRAVENOUS at 12:26

## 2025-01-08 RX ADMIN — METOPROLOL TARTRATE 25 MG: 25 TABLET, FILM COATED ORAL at 23:11

## 2025-01-08 RX ADMIN — CLINDAMYCIN IN 5 PERCENT DEXTROSE 600 MG: 12 INJECTION, SOLUTION INTRAVENOUS at 05:04

## 2025-01-08 RX ADMIN — PANTOPRAZOLE SODIUM 40 MG: 40 TABLET, DELAYED RELEASE ORAL at 05:02

## 2025-01-08 RX ADMIN — HYDROCHLOROTHIAZIDE 25 MG: 25 TABLET ORAL at 08:42

## 2025-01-08 RX ADMIN — CLINDAMYCIN IN 5 PERCENT DEXTROSE 600 MG: 12 INJECTION, SOLUTION INTRAVENOUS at 20:39

## 2025-01-08 RX ADMIN — SODIUM CHLORIDE, PRESERVATIVE FREE 2 ML: 5 INJECTION INTRAVENOUS at 20:39

## 2025-01-08 RX ADMIN — WATER 2000 MG: 1 INJECTION INTRAMUSCULAR; INTRAVENOUS; SUBCUTANEOUS at 23:22

## 2025-01-08 RX ADMIN — ACETAMINOPHEN AND CODEINE PHOSPHATE 1 TABLET: 300; 30 TABLET ORAL at 23:19

## 2025-01-08 RX ADMIN — APIXABAN 5 MG: 5 TABLET, FILM COATED ORAL at 08:42

## 2025-01-08 ASSESSMENT — PAIN SCALES - GENERAL
PAINLEVEL_OUTOF10: 6
PAINLEVEL_OUTOF10: 3
PAINLEVEL_OUTOF10: 3

## 2025-01-08 ASSESSMENT — PAIN SCALES - WONG BAKER: WONGBAKER_NUMERICALRESPONSE: 0

## 2025-01-09 VITALS
SYSTOLIC BLOOD PRESSURE: 127 MMHG | HEIGHT: 70 IN | WEIGHT: 184.19 LBS | OXYGEN SATURATION: 97 % | RESPIRATION RATE: 18 BRPM | TEMPERATURE: 98.2 F | DIASTOLIC BLOOD PRESSURE: 80 MMHG | HEART RATE: 63 BPM | BODY MASS INDEX: 26.37 KG/M2

## 2025-01-09 LAB
ALBUMIN SERPL-MCNC: 3 G/DL (ref 3.4–5)
ALBUMIN/GLOB SERPL: 0.9 {RATIO} (ref 1–2.4)
ALP SERPL-CCNC: 82 UNITS/L (ref 45–117)
ALT SERPL-CCNC: 15 UNITS/L
ANION GAP SERPL CALC-SCNC: 10 MMOL/L (ref 7–19)
AST SERPL-CCNC: 15 UNITS/L
BASOPHILS # BLD: 0 K/MCL (ref 0–0.3)
BASOPHILS NFR BLD: 0 %
BILIRUB SERPL-MCNC: 0.6 MG/DL (ref 0.2–1)
BUN SERPL-MCNC: 15 MG/DL (ref 6–20)
BUN/CREAT SERPL: 27 (ref 7–25)
CALCIUM SERPL-MCNC: 9.3 MG/DL (ref 8.4–10.2)
CHLORIDE SERPL-SCNC: 104 MMOL/L (ref 97–110)
CO2 SERPL-SCNC: 29 MMOL/L (ref 21–32)
CREAT SERPL-MCNC: 0.56 MG/DL (ref 0.51–0.95)
DEPRECATED RDW RBC: 39.7 FL (ref 39–50)
EGFRCR SERPLBLD CKD-EPI 2021: >90 ML/MIN/{1.73_M2}
EOSINOPHIL # BLD: 0.3 K/MCL (ref 0–0.5)
EOSINOPHIL NFR BLD: 6 %
ERYTHROCYTE [DISTWIDTH] IN BLOOD: 13 % (ref 11–15)
FASTING DURATION TIME PATIENT: ABNORMAL H
GLOBULIN SER-MCNC: 3.3 G/DL (ref 2–4)
GLUCOSE SERPL-MCNC: 92 MG/DL (ref 70–99)
HCT VFR BLD CALC: 30.6 % (ref 36–46.5)
HGB BLD-MCNC: 9.7 G/DL (ref 12–15.5)
IMM GRANULOCYTES # BLD AUTO: 0 K/MCL (ref 0–0.2)
IMM GRANULOCYTES # BLD: 1 %
LYMPHOCYTES # BLD: 1.6 K/MCL (ref 1–4)
LYMPHOCYTES NFR BLD: 34 %
MCH RBC QN AUTO: 26.5 PG (ref 26–34)
MCHC RBC AUTO-ENTMCNC: 31.7 G/DL (ref 32–36.5)
MCV RBC AUTO: 83.6 FL (ref 78–100)
MONOCYTES # BLD: 0.5 K/MCL (ref 0.3–0.9)
MONOCYTES NFR BLD: 11 %
NEUTROPHILS # BLD: 2.2 K/MCL (ref 1.8–7.7)
NEUTROPHILS NFR BLD: 48 %
NRBC BLD MANUAL-RTO: 0 /100 WBC
PLATELET # BLD AUTO: 236 K/MCL (ref 140–450)
POTASSIUM SERPL-SCNC: 4.1 MMOL/L (ref 3.4–5.1)
PROT SERPL-MCNC: 6.3 G/DL (ref 6.4–8.2)
RBC # BLD: 3.66 MIL/MCL (ref 4–5.2)
SODIUM SERPL-SCNC: 139 MMOL/L (ref 135–145)
WBC # BLD: 4.6 K/MCL (ref 4.2–11)

## 2025-01-09 PROCEDURE — 10002800 HB RX 250 W HCPCS: Performed by: INTERNAL MEDICINE

## 2025-01-09 PROCEDURE — 10002803 HB RX 637: Performed by: INTERNAL MEDICINE

## 2025-01-09 PROCEDURE — 80053 COMPREHEN METABOLIC PANEL: CPT | Performed by: STUDENT IN AN ORGANIZED HEALTH CARE EDUCATION/TRAINING PROGRAM

## 2025-01-09 PROCEDURE — 85025 COMPLETE CBC W/AUTO DIFF WBC: CPT | Performed by: STUDENT IN AN ORGANIZED HEALTH CARE EDUCATION/TRAINING PROGRAM

## 2025-01-09 PROCEDURE — 36415 COLL VENOUS BLD VENIPUNCTURE: CPT | Performed by: STUDENT IN AN ORGANIZED HEALTH CARE EDUCATION/TRAINING PROGRAM

## 2025-01-09 PROCEDURE — 99239 HOSP IP/OBS DSCHRG MGMT >30: CPT | Performed by: STUDENT IN AN ORGANIZED HEALTH CARE EDUCATION/TRAINING PROGRAM

## 2025-01-09 RX ORDER — CEFDINIR 300 MG/1
300 CAPSULE ORAL 2 TIMES DAILY
Qty: 20 CAPSULE | Refills: 0 | Status: SHIPPED | OUTPATIENT
Start: 2025-01-09 | End: 2025-01-19

## 2025-01-09 RX ORDER — CEFDINIR 300 MG/1
300 CAPSULE ORAL EVERY 12 HOURS SCHEDULED
Status: DISCONTINUED | OUTPATIENT
Start: 2025-01-09 | End: 2025-01-09 | Stop reason: HOSPADM

## 2025-01-09 RX ORDER — GABAPENTIN 300 MG/1
300 CAPSULE ORAL EVERY 6 HOURS
Qty: 90 CAPSULE | Refills: 1 | Status: SHIPPED | OUTPATIENT
Start: 2025-01-09

## 2025-01-09 RX ORDER — MINOCYCLINE HYDROCHLORIDE 100 MG/1
100 TABLET ORAL 2 TIMES DAILY
Qty: 20 TABLET | Refills: 0 | Status: SHIPPED
Start: 2025-01-09 | End: 2025-01-19

## 2025-01-09 RX ORDER — MINOCYCLINE HYDROCHLORIDE 100 MG/1
100 CAPSULE ORAL EVERY 12 HOURS SCHEDULED
Status: DISCONTINUED | OUTPATIENT
Start: 2025-01-09 | End: 2025-01-09 | Stop reason: HOSPADM

## 2025-01-09 RX ADMIN — CLINDAMYCIN IN 5 PERCENT DEXTROSE 600 MG: 12 INJECTION, SOLUTION INTRAVENOUS at 04:57

## 2025-01-09 RX ADMIN — PANTOPRAZOLE SODIUM 40 MG: 40 TABLET, DELAYED RELEASE ORAL at 05:00

## 2025-01-09 RX ADMIN — METOPROLOL TARTRATE 25 MG: 25 TABLET, FILM COATED ORAL at 09:11

## 2025-01-09 RX ADMIN — HYDROCHLOROTHIAZIDE 25 MG: 25 TABLET ORAL at 09:11

## 2025-01-09 RX ADMIN — CLINDAMYCIN IN 5 PERCENT DEXTROSE 600 MG: 12 INJECTION, SOLUTION INTRAVENOUS at 12:22

## 2025-01-09 RX ADMIN — APIXABAN 5 MG: 5 TABLET, FILM COATED ORAL at 09:11

## 2025-01-09 RX ADMIN — HYDROXYCHLOROQUINE SULFATE 200 MG: 200 TABLET, FILM COATED ORAL at 09:11

## 2025-01-09 ASSESSMENT — PAIN SCALES - GENERAL
PAINLEVEL_OUTOF10: 2
PAINLEVEL_OUTOF10: 0

## 2025-01-09 ASSESSMENT — PAIN SCALES - WONG BAKER: WONGBAKER_NUMERICALRESPONSE: 0

## 2025-08-07 ENCOUNTER — APPOINTMENT (OUTPATIENT)
Dept: URGENT CARE | Age: 80
End: 2025-08-07

## 2025-08-07 ENCOUNTER — WALK IN (OUTPATIENT)
Dept: URGENT CARE | Age: 80
End: 2025-08-07
Attending: FAMILY MEDICINE

## 2025-08-07 VITALS
RESPIRATION RATE: 16 BRPM | OXYGEN SATURATION: 98 % | HEART RATE: 76 BPM | DIASTOLIC BLOOD PRESSURE: 55 MMHG | SYSTOLIC BLOOD PRESSURE: 131 MMHG | TEMPERATURE: 98.3 F

## 2025-08-07 DIAGNOSIS — J02.9 ACUTE PHARYNGITIS, UNSPECIFIED ETIOLOGY: Primary | ICD-10-CM

## 2025-08-07 LAB
INTERNAL PROCEDURAL CONTROLS ACCEPTABLE: YES
INTERNAL PROCEDURAL CONTROLS ACCEPTABLE: YES
S PYO AG THROAT QL IA.RAPID: NEGATIVE
SARS-COV+SARS-COV-2 AG RESP QL IA.RAPID: NOT DETECTED
TEST LOT EXPIRATION DATE: NORMAL
TEST LOT EXPIRATION DATE: NORMAL
TEST LOT NUMBER: NORMAL
TEST LOT NUMBER: NORMAL

## 2025-08-07 PROCEDURE — 87081 CULTURE SCREEN ONLY: CPT | Performed by: FAMILY MEDICINE

## 2025-08-07 PROCEDURE — 87426 SARSCOV CORONAVIRUS AG IA: CPT | Performed by: FAMILY MEDICINE

## 2025-08-07 PROCEDURE — 87880 STREP A ASSAY W/OPTIC: CPT | Performed by: FAMILY MEDICINE

## 2025-08-07 RX ORDER — MONTELUKAST SODIUM 10 MG/1
10 TABLET ORAL DAILY
COMMUNITY
Start: 2025-06-23

## 2025-08-07 RX ORDER — TRAMADOL HYDROCHLORIDE 50 MG/1
50 TABLET ORAL EVERY 12 HOURS
COMMUNITY
Start: 2025-07-30

## 2025-08-07 ASSESSMENT — ENCOUNTER SYMPTOMS
LIGHT-HEADEDNESS: 0
VOICE CHANGE: 0
SINUS PAIN: 0
CHANGE IN BOWEL HABIT: 0
APPETITE CHANGE: 0
SHORTNESS OF BREATH: 0
ADENOPATHY: 0
HEADACHES: 0
WHEEZING: 0
NAUSEA: 0
TROUBLE SWALLOWING: 0
DIARRHEA: 0
VOMITING: 0
ACTIVITY CHANGE: 0
ABDOMINAL PAIN: 0
FEVER: 0
EYE REDNESS: 0
EYE ITCHING: 0
DIZZINESS: 0
EYE DISCHARGE: 0
COUGH: 0
SORE THROAT: 1

## 2025-08-09 LAB — S PYO SPEC QL CULT: NORMAL

## (undated) DEVICE — Device: Brand: STABLECUT®

## (undated) DEVICE — NEEDLE HPO 18GA 1.5IN ECLPS

## (undated) DEVICE — BATTERY

## (undated) DEVICE — ANTIBACTERIAL UNDYED BRAIDED (POLYGLACTIN 910), SYNTHETIC ABSORBABLE SUTURE: Brand: COATED VICRYL

## (undated) DEVICE — DUAL CUT SAGITTAL BLADE

## (undated) DEVICE — Device

## (undated) DEVICE — SYRINGE MNJCT 35ML LF STRL LL

## (undated) DEVICE — STANDARD HYPODERMIC NEEDLE,POLYPROPYLENE HUB: Brand: MONOJECT

## (undated) DEVICE — STERILE POLYISOPRENE POWDER-FREE SURGICAL GLOVES: Brand: PROTEXIS

## (undated) DEVICE — SUTURE MONOCRYL 3-0 Y936H

## (undated) DEVICE — CONTAINER SPEC STR 4OZ GRY LID

## (undated) DEVICE — GAUZE SPONGES,12 PLY: Brand: CURITY

## (undated) DEVICE — SOL  .9 3000ML

## (undated) DEVICE — SYRINGE 30ML LL TIP

## (undated) DEVICE — ABDOMINAL PAD: Brand: CURITY

## (undated) DEVICE — T5 HOOD WITH PEEL AWAY FACE SHIELD

## (undated) DEVICE — POLAR CARE CUBE COOLING UNIT

## (undated) DEVICE — 60 ML SYRINGE,TOOMEY TYPE: Brand: MONOJECT

## (undated) DEVICE — SOL  .9 1000ML BTL

## (undated) DEVICE — GAMMEX® PI HYBRID SIZE 8.5, STERILE POWDER-FREE SURGICAL GLOVE, POLYISOPRENE AND NEOPRENE BLEND: Brand: GAMMEX

## (undated) DEVICE — DERMABOND LIQUID ADHESIVE

## (undated) DEVICE — TOTAL KNEE: Brand: MEDLINE INDUSTRIES, INC.

## (undated) DEVICE — BOWL CEMENT MIX QUICK-VAC

## (undated) DEVICE — COTTON UNDERCAST PADDING,REGULAR FINISH: Brand: WEBRIL

## (undated) DEVICE — 60 ML SYRINGE LUER-LOCK TIP: Brand: MONOJECT

## (undated) DEVICE — ZIMMER® STERILE DISPOSABLE TOURNIQUET CUFF WITH PLC, DUAL PORT, SINGLE BLADDER, 34 IN. (86 CM)

## (undated) DEVICE — PAD THRP 16IN WRPON MU LNG STM

## (undated) DEVICE — ANTIBACTERIAL VIOLET BRAIDED (POLYGLACTIN 910), SYNTHETIC ABSORBABLE SUTURE: Brand: COATED VICRYL

## (undated) DEVICE — CHLORAPREP 26ML APPLICATOR

## (undated) DEVICE — 3M™ IOBAN™ 2 ANTIMICROBIAL INCISE DRAPE 6650EZ: Brand: IOBAN™ 2

## (undated) DEVICE — KENDALL SCD EXPRESS SLEEVES, KNEE LENGTH, MEDIUM: Brand: KENDALL SCD

## (undated) DEVICE — DRAPE SHEET LG

## (undated) DEVICE — DRAPE SRG 70X60IN SPLT U IMPRV

## (undated) NOTE — ED AVS SNAPSHOT
Mya Stock   MRN: [id number]    Department:  [facility] Emergency Department   Date of Visit:  10/28/2018           Disclosure     Insurance plans vary and the physician(s) referred by the ER may not be covered by your plan.  Please contact within the next three months to obtain basic health screening including reassessment of your blood pressure.     IF THERE IS ANY CHANGE OR WORSENING OF YOUR CONDITION, CALL YOUR PRIMARY CARE PHYSICIAN AT ONCE OR RETURN IMMEDIATELY TO THE EMERGENCY DEPARTMEN

## (undated) NOTE — LETTER
Elisa Fernandes [address] [address]  [place], [address] [facility]    Consent for Operation  Date: __________________                                Time: _______________    1.  I authorize the performance upon Benoit Townsend the following operation:  Procedure procedure has been videotaped, the surgeon will obtain the original videotape. The hospital will not be responsible for storage or maintenance of this tape.   7. For the purpose of advancing medical education, I consent to the admittance of observers to the STATEMENTS REQUIRING INSERTION OR COMPLETION WERE FILLED IN.     Signature of Patient:   ___________________________    When the patient is a minor or mentally incompetent to give consent:  Signature of person authorized to consent for patient: ____________ supplements, and pills I can buy without a prescription (including street drugs/illegal medications). Failure to inform my anesthesiologist about these medicines may increase my risk of anesthetic complications. iv.  If I am allergic to anything or have ha Anesthesiologist Signature     Date   Time  I have discussed the procedure and information above with the patient (or patient’s representative) and answered their questions. The patient or their representative has agreed to have anesthesia services.     ___